# Patient Record
Sex: MALE | Race: WHITE | Employment: FULL TIME | URBAN - METROPOLITAN AREA
[De-identification: names, ages, dates, MRNs, and addresses within clinical notes are randomized per-mention and may not be internally consistent; named-entity substitution may affect disease eponyms.]

---

## 2019-09-04 ENCOUNTER — HOSPITAL ENCOUNTER (OUTPATIENT)
Dept: GENERAL RADIOLOGY | Age: 54
Discharge: HOME | End: 2019-09-06
Payer: COMMERCIAL

## 2019-09-04 ENCOUNTER — HOSPITAL ENCOUNTER (OUTPATIENT)
Age: 54
Discharge: HOME | End: 2019-09-06
Payer: COMMERCIAL

## 2019-09-04 DIAGNOSIS — M54.2 CERVICALGIA: ICD-10-CM

## 2019-09-04 DIAGNOSIS — M65.812 OTHER SYNOVITIS AND TENOSYNOVITIS, LEFT SHOULDER: ICD-10-CM

## 2019-09-04 DIAGNOSIS — M25.512 LEFT SHOULDER PAIN, UNSPECIFIED CHRONICITY: ICD-10-CM

## 2019-09-04 PROCEDURE — 72050 X-RAY EXAM NECK SPINE 4/5VWS: CPT

## 2019-09-04 PROCEDURE — 73030 X-RAY EXAM OF SHOULDER: CPT

## 2019-09-10 ENCOUNTER — HOSPITAL ENCOUNTER (OUTPATIENT)
Dept: MRI IMAGING | Age: 54
Discharge: HOME | End: 2019-09-12
Payer: COMMERCIAL

## 2019-09-10 DIAGNOSIS — M65.812 OTHER SYNOVITIS AND TENOSYNOVITIS, LEFT SHOULDER: ICD-10-CM

## 2019-09-10 DIAGNOSIS — M75.42 IMPINGEMENT SYNDROME OF LEFT SHOULDER: ICD-10-CM

## 2019-09-10 DIAGNOSIS — M25.512 LEFT SHOULDER PAIN, UNSPECIFIED CHRONICITY: ICD-10-CM

## 2019-09-10 PROCEDURE — 73221 MRI JOINT UPR EXTREM W/O DYE: CPT

## 2021-03-31 ENCOUNTER — HOSPITAL ENCOUNTER (OUTPATIENT)
Age: 56
Discharge: HOME | End: 2021-04-02
Payer: COMMERCIAL

## 2021-03-31 ENCOUNTER — HOSPITAL ENCOUNTER (OUTPATIENT)
Dept: GENERAL RADIOLOGY | Age: 56
Discharge: HOME | End: 2021-04-02
Payer: COMMERCIAL

## 2021-03-31 DIAGNOSIS — M79.674 PAIN IN RIGHT TOE(S): ICD-10-CM

## 2021-03-31 DIAGNOSIS — M25.474 EFFUSION, RIGHT FOOT: ICD-10-CM

## 2021-03-31 DIAGNOSIS — M25.571 PAIN IN RIGHT ANKLE AND JOINTS OF RIGHT FOOT: ICD-10-CM

## 2021-03-31 PROCEDURE — 73630 X-RAY EXAM OF FOOT: CPT

## 2021-06-01 ENCOUNTER — HOSPITAL ENCOUNTER (OUTPATIENT)
Dept: GENERAL RADIOLOGY | Age: 56
Discharge: HOME | End: 2021-06-03
Payer: COMMERCIAL

## 2021-06-01 ENCOUNTER — HOSPITAL ENCOUNTER (OUTPATIENT)
Age: 56
Discharge: HOME | End: 2021-06-03
Payer: COMMERCIAL

## 2021-06-01 DIAGNOSIS — M99.02 SEGMENTAL AND SOMATIC DYSFUNCTION OF THORACIC REGION: ICD-10-CM

## 2021-06-01 DIAGNOSIS — M99.01 SEGMENTAL AND SOMATIC DYSFUNCTION OF CERVICAL REGION: ICD-10-CM

## 2021-06-01 DIAGNOSIS — S13.4XXA SPRAIN OF LIGAMENTS OF CERVICAL SPINE, INITIAL ENCOUNTER: ICD-10-CM

## 2021-06-01 DIAGNOSIS — S23.3XXA SPRAIN OF LIGAMENTS OF THORACIC SPINE, INITIAL ENCOUNTER: ICD-10-CM

## 2021-06-01 PROCEDURE — 72050 X-RAY EXAM NECK SPINE 4/5VWS: CPT

## 2021-06-01 PROCEDURE — 72072 X-RAY EXAM THORAC SPINE 3VWS: CPT

## 2022-12-17 ENCOUNTER — APPOINTMENT (OUTPATIENT)
Dept: GENERAL RADIOLOGY | Age: 57
End: 2022-12-17
Payer: COMMERCIAL

## 2022-12-17 ENCOUNTER — HOSPITAL ENCOUNTER (EMERGENCY)
Age: 57
Discharge: ANOTHER HOSPITAL | End: 2022-12-17
Attending: EMERGENCY MEDICINE
Payer: COMMERCIAL

## 2022-12-17 ENCOUNTER — HOSPITAL ENCOUNTER (INPATIENT)
Age: 57
LOS: 1 days | Discharge: HOME | DRG: 247 | End: 2022-12-19
Attending: INTERNAL MEDICINE | Admitting: INTERNAL MEDICINE
Payer: COMMERCIAL

## 2022-12-17 VITALS
HEIGHT: 70 IN | TEMPERATURE: 98.4 F | DIASTOLIC BLOOD PRESSURE: 96 MMHG | SYSTOLIC BLOOD PRESSURE: 170 MMHG | HEART RATE: 74 BPM | BODY MASS INDEX: 28.49 KG/M2 | WEIGHT: 199 LBS | RESPIRATION RATE: 20 BRPM | OXYGEN SATURATION: 98 %

## 2022-12-17 DIAGNOSIS — I21.3 ST ELEVATION MYOCARDIAL INFARCTION (STEMI), UNSPECIFIED ARTERY (HCC): ICD-10-CM

## 2022-12-17 DIAGNOSIS — N18.31 STAGE 3A CHRONIC KIDNEY DISEASE (HCC): ICD-10-CM

## 2022-12-17 DIAGNOSIS — I25.5 ISCHEMIC CARDIOMYOPATHY: ICD-10-CM

## 2022-12-17 DIAGNOSIS — R77.8 ELEVATED TROPONIN: Primary | ICD-10-CM

## 2022-12-17 DIAGNOSIS — I21.21 ST ELEVATION MYOCARDIAL INFARCTION INVOLVING LEFT CIRCUMFLEX CORONARY ARTERY (HCC): Primary | ICD-10-CM

## 2022-12-17 LAB
ALBUMIN SERPL-MCNC: 4.1 G/DL (ref 3.5–5.2)
ALP BLD-CCNC: 75 U/L (ref 40–129)
ALT SERPL-CCNC: 24 U/L (ref 0–40)
ANION GAP SERPL CALCULATED.3IONS-SCNC: 12 MMOL/L (ref 7–16)
APTT: 26.2 SEC (ref 24.5–35.1)
AST SERPL-CCNC: 25 U/L (ref 0–39)
BILIRUB SERPL-MCNC: 0.2 MG/DL (ref 0–1.2)
BUN BLDV-MCNC: 27 MG/DL (ref 6–20)
CALCIUM SERPL-MCNC: 9.5 MG/DL (ref 8.6–10.2)
CHLORIDE BLD-SCNC: 100 MMOL/L (ref 98–107)
CO2: 25 MMOL/L (ref 22–29)
CREAT SERPL-MCNC: 2 MG/DL (ref 0.7–1.2)
D DIMER: 683 NG/ML DDU
GFR SERPL CREATININE-BSD FRML MDRD: 38 ML/MIN/1.73
GLUCOSE BLD-MCNC: 205 MG/DL (ref 74–99)
HCT VFR BLD CALC: 44.9 % (ref 37–54)
HEMOGLOBIN: 15.8 G/DL (ref 12.5–16.5)
INR BLD: 0.9
MAGNESIUM: 2.3 MG/DL (ref 1.6–2.6)
MCH RBC QN AUTO: 30 PG (ref 26–35)
MCHC RBC AUTO-ENTMCNC: 35.2 % (ref 32–34.5)
MCV RBC AUTO: 85.2 FL (ref 80–99.9)
PDW BLD-RTO: 12.6 FL (ref 11.5–15)
PLATELET # BLD: 188 E9/L (ref 130–450)
PMV BLD AUTO: 9 FL (ref 7–12)
POC ACT LR: 203 SECONDS
POC ACT LR: 238 SECONDS
POC ACT LR: 289 SECONDS
POTASSIUM SERPL-SCNC: 4.2 MMOL/L (ref 3.5–5)
PRO-BNP: 290 PG/ML (ref 0–125)
PROTHROMBIN TIME: 10.1 SEC (ref 9.3–12.4)
RBC # BLD: 5.27 E12/L (ref 3.8–5.8)
SODIUM BLD-SCNC: 137 MMOL/L (ref 132–146)
TOTAL PROTEIN: 7.1 G/DL (ref 6.4–8.3)
TROPONIN, HIGH SENSITIVITY: 130 NG/L (ref 0–11)
TROPONIN, HIGH SENSITIVITY: 160 NG/L (ref 0–11)
WBC # BLD: 10.9 E9/L (ref 4.5–11.5)

## 2022-12-17 PROCEDURE — 2580000003 HC RX 258: Performed by: INTERNAL MEDICINE

## 2022-12-17 PROCEDURE — 2709999900 HC NON-CHARGEABLE SUPPLY

## 2022-12-17 PROCEDURE — C1874 STENT, COATED/COV W/DEL SYS: HCPCS

## 2022-12-17 PROCEDURE — 71046 X-RAY EXAM CHEST 2 VIEWS: CPT

## 2022-12-17 PROCEDURE — 83735 ASSAY OF MAGNESIUM: CPT

## 2022-12-17 PROCEDURE — 85027 COMPLETE CBC AUTOMATED: CPT

## 2022-12-17 PROCEDURE — 84484 ASSAY OF TROPONIN QUANT: CPT

## 2022-12-17 PROCEDURE — 92941 PRQ TRLML REVSC TOT OCCL AMI: CPT

## 2022-12-17 PROCEDURE — 4A023N7 MEASUREMENT OF CARDIAC SAMPLING AND PRESSURE, LEFT HEART, PERCUTANEOUS APPROACH: ICD-10-PCS | Performed by: INTERNAL MEDICINE

## 2022-12-17 PROCEDURE — 93005 ELECTROCARDIOGRAM TRACING: CPT

## 2022-12-17 PROCEDURE — 93458 L HRT ARTERY/VENTRICLE ANGIO: CPT

## 2022-12-17 PROCEDURE — 96376 TX/PRO/DX INJ SAME DRUG ADON: CPT

## 2022-12-17 PROCEDURE — G0378 HOSPITAL OBSERVATION PER HR: HCPCS

## 2022-12-17 PROCEDURE — G0379 DIRECT REFER HOSPITAL OBSERV: HCPCS

## 2022-12-17 PROCEDURE — 99291 CRITICAL CARE FIRST HOUR: CPT | Performed by: INTERNAL MEDICINE

## 2022-12-17 PROCEDURE — 85347 COAGULATION TIME ACTIVATED: CPT

## 2022-12-17 PROCEDURE — 2500000003 HC RX 250 WO HCPCS

## 2022-12-17 PROCEDURE — C1725 CATH, TRANSLUMIN NON-LASER: HCPCS

## 2022-12-17 PROCEDURE — 6370000000 HC RX 637 (ALT 250 FOR IP)

## 2022-12-17 PROCEDURE — 92928 PRQ TCAT PLMT NTRAC ST 1 LES: CPT | Performed by: INTERNAL MEDICINE

## 2022-12-17 PROCEDURE — 6360000002 HC RX W HCPCS

## 2022-12-17 PROCEDURE — 85730 THROMBOPLASTIN TIME PARTIAL: CPT

## 2022-12-17 PROCEDURE — B211YZZ FLUOROSCOPY OF MULTIPLE CORONARY ARTERIES USING OTHER CONTRAST: ICD-10-PCS | Performed by: INTERNAL MEDICINE

## 2022-12-17 PROCEDURE — 93458 L HRT ARTERY/VENTRICLE ANGIO: CPT | Performed by: INTERNAL MEDICINE

## 2022-12-17 PROCEDURE — C1894 INTRO/SHEATH, NON-LASER: HCPCS

## 2022-12-17 PROCEDURE — 83880 ASSAY OF NATRIURETIC PEPTIDE: CPT

## 2022-12-17 PROCEDURE — 2580000003 HC RX 258

## 2022-12-17 PROCEDURE — 93005 ELECTROCARDIOGRAM TRACING: CPT | Performed by: PHYSICIAN ASSISTANT

## 2022-12-17 PROCEDURE — 85610 PROTHROMBIN TIME: CPT

## 2022-12-17 PROCEDURE — 6370000000 HC RX 637 (ALT 250 FOR IP): Performed by: INTERNAL MEDICINE

## 2022-12-17 PROCEDURE — C1769 GUIDE WIRE: HCPCS

## 2022-12-17 PROCEDURE — C1887 CATHETER, GUIDING: HCPCS

## 2022-12-17 PROCEDURE — 99285 EMERGENCY DEPT VISIT HI MDM: CPT

## 2022-12-17 PROCEDURE — 85378 FIBRIN DEGRADE SEMIQUANT: CPT

## 2022-12-17 PROCEDURE — 027034Z DILATION OF CORONARY ARTERY, ONE ARTERY WITH DRUG-ELUTING INTRALUMINAL DEVICE, PERCUTANEOUS APPROACH: ICD-10-PCS | Performed by: INTERNAL MEDICINE

## 2022-12-17 PROCEDURE — 96365 THER/PROPH/DIAG IV INF INIT: CPT

## 2022-12-17 PROCEDURE — 80053 COMPREHEN METABOLIC PANEL: CPT

## 2022-12-17 RX ORDER — ACETAMINOPHEN 325 MG/1
650 TABLET ORAL EVERY 4 HOURS PRN
Status: DISCONTINUED | OUTPATIENT
Start: 2022-12-17 | End: 2022-12-19

## 2022-12-17 RX ORDER — 0.9 % SODIUM CHLORIDE 0.9 %
500 INTRAVENOUS SOLUTION INTRAVENOUS ONCE
Status: COMPLETED | OUTPATIENT
Start: 2022-12-17 | End: 2022-12-17

## 2022-12-17 RX ORDER — HEPARIN SODIUM 10000 [USP'U]/100ML
5-30 INJECTION, SOLUTION INTRAVENOUS CONTINUOUS
Status: DISCONTINUED | OUTPATIENT
Start: 2022-12-17 | End: 2022-12-17 | Stop reason: HOSPADM

## 2022-12-17 RX ORDER — LOSARTAN POTASSIUM 25 MG/1
25 TABLET ORAL DAILY
Status: DISCONTINUED | OUTPATIENT
Start: 2022-12-18 | End: 2022-12-18

## 2022-12-17 RX ORDER — ROSUVASTATIN CALCIUM 20 MG/1
40 TABLET, COATED ORAL NIGHTLY
Status: DISCONTINUED | OUTPATIENT
Start: 2022-12-17 | End: 2022-12-19 | Stop reason: HOSPADM

## 2022-12-17 RX ORDER — OXYCODONE HYDROCHLORIDE AND ACETAMINOPHEN 5; 325 MG/1; MG/1
1 TABLET ORAL EVERY 4 HOURS PRN
Status: DISCONTINUED | OUTPATIENT
Start: 2022-12-17 | End: 2022-12-19 | Stop reason: HOSPADM

## 2022-12-17 RX ORDER — HEPARIN SODIUM 1000 [USP'U]/ML
4000 INJECTION, SOLUTION INTRAVENOUS; SUBCUTANEOUS PRN
Status: DISCONTINUED | OUTPATIENT
Start: 2022-12-17 | End: 2022-12-17 | Stop reason: HOSPADM

## 2022-12-17 RX ORDER — HEPARIN SODIUM 1000 [USP'U]/ML
2000 INJECTION, SOLUTION INTRAVENOUS; SUBCUTANEOUS PRN
Status: DISCONTINUED | OUTPATIENT
Start: 2022-12-17 | End: 2022-12-17 | Stop reason: HOSPADM

## 2022-12-17 RX ORDER — HEPARIN SODIUM 1000 [USP'U]/ML
4000 INJECTION, SOLUTION INTRAVENOUS; SUBCUTANEOUS ONCE
Status: COMPLETED | OUTPATIENT
Start: 2022-12-17 | End: 2022-12-17

## 2022-12-17 RX ORDER — ASPIRIN 81 MG/1
81 TABLET ORAL DAILY
Status: DISCONTINUED | OUTPATIENT
Start: 2022-12-18 | End: 2022-12-19 | Stop reason: HOSPADM

## 2022-12-17 RX ORDER — CARVEDILOL 3.12 MG/1
3.12 TABLET ORAL 2 TIMES DAILY WITH MEALS
Status: DISCONTINUED | OUTPATIENT
Start: 2022-12-18 | End: 2022-12-18

## 2022-12-17 RX ORDER — LISINOPRIL 2.5 MG/1
2.5 TABLET ORAL DAILY
Status: ON HOLD | COMMUNITY
End: 2022-12-19 | Stop reason: HOSPADM

## 2022-12-17 RX ORDER — SODIUM CHLORIDE 9 MG/ML
INJECTION, SOLUTION INTRAVENOUS CONTINUOUS
Status: ACTIVE | OUTPATIENT
Start: 2022-12-17 | End: 2022-12-18

## 2022-12-17 RX ADMIN — HEPARIN SODIUM 4000 UNITS: 1000 INJECTION INTRAVENOUS; SUBCUTANEOUS at 19:57

## 2022-12-17 RX ADMIN — SODIUM CHLORIDE 500 ML: 9 INJECTION, SOLUTION INTRAVENOUS at 19:39

## 2022-12-17 RX ADMIN — ROSUVASTATIN 40 MG: 20 TABLET, FILM COATED ORAL at 23:36

## 2022-12-17 RX ADMIN — HEPARIN SODIUM 11.07 UNITS/KG/HR: 10000 INJECTION, SOLUTION INTRAVENOUS at 20:03

## 2022-12-17 RX ADMIN — ASPIRIN 325 MG: 325 TABLET, DELAYED RELEASE ORAL at 19:44

## 2022-12-17 RX ADMIN — SODIUM CHLORIDE: 9 INJECTION, SOLUTION INTRAVENOUS at 23:35

## 2022-12-17 ASSESSMENT — PAIN DESCRIPTION - FREQUENCY: FREQUENCY: CONTINUOUS

## 2022-12-17 ASSESSMENT — PAIN - FUNCTIONAL ASSESSMENT
PAIN_FUNCTIONAL_ASSESSMENT: 0-10

## 2022-12-17 ASSESSMENT — ENCOUNTER SYMPTOMS
COLOR CHANGE: 0
SHORTNESS OF BREATH: 1
CHOKING: 0
COUGH: 0
ABDOMINAL PAIN: 0
VOMITING: 0
DIARRHEA: 0
CONSTIPATION: 0
SORE THROAT: 0
NAUSEA: 0
BLOOD IN STOOL: 0

## 2022-12-17 ASSESSMENT — PAIN SCALES - GENERAL
PAINLEVEL_OUTOF10: 5
PAINLEVEL_OUTOF10: 5
PAINLEVEL_OUTOF10: 0
PAINLEVEL_OUTOF10: 5
PAINLEVEL_OUTOF10: 5
PAINLEVEL_OUTOF10: 4

## 2022-12-17 ASSESSMENT — PAIN DESCRIPTION - ORIENTATION
ORIENTATION: MID
ORIENTATION: MID

## 2022-12-17 ASSESSMENT — LIFESTYLE VARIABLES
HOW MANY STANDARD DRINKS CONTAINING ALCOHOL DO YOU HAVE ON A TYPICAL DAY: 1 OR 2
HOW OFTEN DO YOU HAVE A DRINK CONTAINING ALCOHOL: 2-4 TIMES A MONTH

## 2022-12-17 ASSESSMENT — PAIN DESCRIPTION - PAIN TYPE: TYPE: ACUTE PAIN

## 2022-12-17 ASSESSMENT — PAIN DESCRIPTION - LOCATION
LOCATION: CHEST

## 2022-12-17 ASSESSMENT — PAIN DESCRIPTION - DESCRIPTORS
DESCRIPTORS: DISCOMFORT
DESCRIPTORS: ACHING

## 2022-12-17 ASSESSMENT — PAIN DESCRIPTION - ONSET: ONSET: ON-GOING

## 2022-12-17 NOTE — ED PROVIDER NOTES
[address]      Pt Name: Maci Sheikh  MRN: [id number]  [place] 1965  Date of evaluation: 12/17/2022      CHIEF COMPLAINT       Chief Complaint   Patient presents with    Shortness of Breath     SOB for three days, epigastric pain started around 1400, denies other symptoms        HPI  Maci Sheikh is a 62 y.o. male  with PMHx of CKD, HTN  presents with SOB, CP. CP started today at around 12:30pm, substernal, constant, dull. States symptoms started 3 days ago, worse with exertion. Describes symptoms moderate in severity with no alleviating or exacerbating factors. Denies any fever, chills, n/v, headache, dizziness, vision changes, neck tenderness or stiffness, weakness,  palpitations, leg swelling/tenderness, cough, abd pain, dysuria, hematuria, diarrhea, constipation. Except as noted above the remainder of the review of systems was reviewed and negative. Review of Systems   Constitutional:  Negative for appetite change, chills, fatigue and fever. HENT:  Negative for congestion and sore throat. Eyes:  Negative for visual disturbance. Respiratory:  Positive for shortness of breath. Negative for cough and choking. Cardiovascular:  Positive for chest pain. Negative for palpitations and leg swelling. Gastrointestinal:  Negative for abdominal pain, blood in stool, constipation, diarrhea, nausea and vomiting. Endocrine: Negative for polyphagia. Genitourinary:  Negative for decreased urine volume, difficulty urinating, flank pain and hematuria. Musculoskeletal:  Negative for arthralgias, gait problem, joint swelling and myalgias. Skin:  Negative for color change, pallor, rash and wound. Neurological:  Negative for dizziness, tremors, seizures, syncope, weakness, light-headedness, numbness and headaches. Hematological:  Negative for adenopathy. Does not bruise/bleed easily. Psychiatric/Behavioral:  Negative for confusion and hallucinations. All other systems reviewed and are negative. Physical Exam  Vitals reviewed. Constitutional:       General: He is not in acute distress. Appearance: Normal appearance. He is well-developed and normal weight. He is not ill-appearing, toxic-appearing or diaphoretic. HENT:      Head: Normocephalic and atraumatic. Right Ear: External ear normal.      Left Ear: External ear normal.      Nose: Nose normal. No congestion or rhinorrhea. Mouth/Throat:      Mouth: Mucous membranes are moist.      Pharynx: Oropharynx is clear. No oropharyngeal exudate or posterior oropharyngeal erythema. Eyes:      Extraocular Movements: Extraocular movements intact. Conjunctiva/sclera: Conjunctivae normal.      Pupils: Pupils are equal, round, and reactive to light. Cardiovascular:      Rate and Rhythm: Normal rate and regular rhythm. Pulses: Normal pulses. Pulmonary:      Effort: Pulmonary effort is normal. No respiratory distress. Breath sounds: Normal breath sounds. No wheezing or rhonchi. Chest:      Chest wall: Tenderness present. Abdominal:      General: Abdomen is flat. Bowel sounds are normal. There is no distension. Palpations: Abdomen is soft. Tenderness: There is no abdominal tenderness. There is no right CVA tenderness, left CVA tenderness or guarding. Hernia: No hernia is present. Musculoskeletal:      Cervical back: Normal range of motion. Right lower leg: No edema. Left lower leg: No edema. Skin:     General: Skin is warm and dry. Capillary Refill: Capillary refill takes less than 2 seconds. Neurological:      General: No focal deficit present. Mental Status: He is alert and oriented to person, place, and time. Mental status is at baseline. Psychiatric:         Mood and Affect: Mood normal.         Behavior: Behavior normal.         Thought Content:  Thought content normal.         Judgment: Judgment normal.        Procedures     MDM         62 y.o. male  with PMHx of CKD, HTN  presents with SOB, CP. While in the ED patient was hemodynamically stable, afebrile, nontoxic-appearing, in no respiratory distress. Labs remarkable for elevated troponin, BNP, dimer, elevated Cr at around baseline (last cr 1.9). EKG normal sinus with ST elevations on inferolateral leads, evolving EKG in the ED, with ST depression on septal leads. CXR negative for any acute pathologies. Bedside ultrasound negative for pericardial effusion or collapse of Right ventricle. Patient received ASA and heparin bolus and drip with significant symptomatic relief. Spoke to Dr. Jin Potter who will take patient to cath lab, although this more be pericarditis in nature. Patient transferred to cath lab, he is in agreement with plan of transfer. ED Course as of 12/17/22 2111   Sat Dec 17, 2022   1800 EKG: This EKG is signed by emergency department physician. Rate: 68  Qtc:412  Rhythm: Sinus  Interpretation: Sinus, ST elevations inferolateral leads  Comparison: no previous EKG       [TC]   1937 EKG: This EKG is signed by emergency department physician. Rate: 71  Qtc:415ms  Rhythm: Sinus  Interpretation: Sinus with ST elevations iferolateral leads, and ST depressions septal leads  Comparison: changes compared to previous EKG      [TC]      ED Course User Emilio Braxton MD       --------------------------------------------- PAST HISTORY ---------------------------------------------  Past Medical History:  has a past medical history of Chronic kidney disease. Past Surgical History:  has no past surgical history on file. Social History:  reports that he has never smoked. He has never used smokeless tobacco. He reports that he does not use drugs. Family History: family history is not on file. The patients home medications have been reviewed.     Allergies: Patient has no known allergies.     -------------------------------------------------- RESULTS -------------------------------------------------    Lab  Results for orders placed or performed during the hospital encounter of 12/17/22   CBC   Result Value Ref Range    WBC 10.9 4.5 - 11.5 E9/L    RBC 5.27 3.80 - 5.80 E12/L    Hemoglobin 15.8 12.5 - 16.5 g/dL    Hematocrit 44.9 37.0 - 54.0 %    MCV 85.2 80.0 - 99.9 fL    MCH 30.0 26.0 - 35.0 pg    MCHC 35.2 (H) 32.0 - 34.5 %    RDW 12.6 11.5 - 15.0 fL    Platelets 040 119 - 666 E9/L    MPV 9.0 7.0 - 12.0 fL   Comprehensive Metabolic Panel   Result Value Ref Range    Sodium 137 132 - 146 mmol/L    Potassium 4.2 3.5 - 5.0 mmol/L    Chloride 100 98 - 107 mmol/L    CO2 25 22 - 29 mmol/L    Anion Gap 12 7 - 16 mmol/L    Glucose 205 (H) 74 - 99 mg/dL    BUN 27 (H) 6 - 20 mg/dL    Creatinine 2.0 (H) 0.7 - 1.2 mg/dL    Est, Glom Filt Rate 38 >=60 mL/min/1.73    Calcium 9.5 8.6 - 10.2 mg/dL    Total Protein 7.1 6.4 - 8.3 g/dL    Albumin 4.1 3.5 - 5.2 g/dL    Total Bilirubin 0.2 0.0 - 1.2 mg/dL    Alkaline Phosphatase 75 40 - 129 U/L    ALT 24 0 - 40 U/L    AST 25 0 - 39 U/L   Troponin   Result Value Ref Range    Troponin, High Sensitivity 130 (H) 0 - 11 ng/L   Troponin   Result Value Ref Range    Troponin, High Sensitivity 160 (H) 0 - 11 ng/L   Magnesium   Result Value Ref Range    Magnesium 2.3 1.6 - 2.6 mg/dL   D-Dimer, Quantitative   Result Value Ref Range    D-Dimer, Quant 683 ng/mL DDU   Protime-INR   Result Value Ref Range    Protime 10.1 9.3 - 12.4 sec    INR 0.9    APTT   Result Value Ref Range    aPTT 26.2 24.5 - 35.1 sec   Brain Natriuretic Peptide   Result Value Ref Range    Pro- (H) 0 - 125 pg/mL   EKG 12 Lead   Result Value Ref Range    Ventricular Rate 71 BPM    Atrial Rate 71 BPM    P-R Interval 160 ms    QRS Duration 94 ms    Q-T Interval 382 ms    QTc Calculation (Bazett) 415 ms    P Axis 51 degrees    R Axis 0 degrees    T Axis 39 degrees   EKG 12 Lead   Result Value Ref Range    Ventricular Rate 68 BPM    Atrial Rate 68 BPM    P-R Interval 164 ms    QRS Duration 106 ms    Q-T Interval 388 ms    QTc Calculation (Bazett) 412 ms    P Axis 53 degrees    R Axis 13 degrees    T Axis 48 degrees       Radiology  XR CHEST (2 VW)   Final Result   No acute process. ------------------------- NURSING NOTES AND VITALS REVIEWED ---------------------------  Date / Time Roomed:  12/17/2022  5:14 PM  ED Bed Assignment:  09/09    The nursing notes within the ED encounter and vital signs as below have been reviewed. Patient Vitals for the past 24 hrs:   BP Temp Temp src Pulse Resp SpO2 Height Weight   12/17/22 2027 (!) 170/96 98.4 °F (36.9 °C) Oral 74 20 98 % -- --   12/17/22 1941 (!) 165/112 -- -- 78 23 98 % -- --   12/17/22 1848 (!) 153/107 -- -- 76 20 98 % -- --   12/17/22 1747 (!) 161/106 -- -- 73 20 100 % -- --   12/17/22 1708 (!) 167/104 97.3 °F (36.3 °C) Oral 73 16 100 % 5' 10\" (1.778 m) 199 lb (90.3 kg)   12/17/22 1656 -- -- -- 88 20 98 % -- --       Oxygen Saturation Interpretation: Normal      ------------------------------------------ PROGRESS NOTES ------------------------------------------  Re-evaluation(s):  Time: 06:30. Patients symptoms show no change  Repeat physical examination is not changed    Time: 0700. Patients symptoms show no change  Repeat physical examination is not changed    I have spoken with the patient and discussed todays results, in addition to providing specific details for the plan of care and counseling regarding the diagnosis and prognosis. Their questions are answered at this time and they are agreeable with the plan. I have discussed the risks and benefits of transfer and they wish to proceed with the transfer. --------------------------------- ADDITIONAL PROVIDER NOTES ---------------------------------  Consultations:  Spoke with Dr. Ronald King (Cardiology). Discussed case. They will take patient to cath lab.   Reason for transfer: STEMI.    This patient's ED course included: a personal history and physicial examination, re-evaluation prior to disposition, multiple bedside re-evaluations, IV medications, cardiac monitoring, continuous pulse oximetry, and complex medical decision making and emergency management    This patient has remained hemodynamically stable during their ED course. Please note that the withdrawal or failure to initiate urgent interventions for this patient would likely result in a life threatening deterioration or permanent disability. Clinical Impression  1. Elevated troponin    2. ST elevation myocardial infarction (STEMI), unspecified artery ([facility] [address]          Disposition  Patient's disposition: Transfer to Cath lab. Transferred by: Ambulance. Patient's condition is stable.        Nohemy Gamez MD  Resident  12/17/22 9500

## 2022-12-18 LAB
ABO/RH: NORMAL
ANION GAP SERPL CALCULATED.3IONS-SCNC: 14 MMOL/L (ref 7–16)
ANTIBODY SCREEN: NORMAL
BASOPHILS ABSOLUTE: 0.02 E9/L (ref 0–0.2)
BASOPHILS RELATIVE PERCENT: 0.2 % (ref 0–2)
BUN BLDV-MCNC: 23 MG/DL (ref 6–20)
CALCIUM SERPL-MCNC: 9.2 MG/DL (ref 8.6–10.2)
CHLORIDE BLD-SCNC: 106 MMOL/L (ref 98–107)
CHOLESTEROL, FASTING: 202 MG/DL (ref 0–199)
CO2: 19 MMOL/L (ref 22–29)
CREAT SERPL-MCNC: 1.8 MG/DL (ref 0.7–1.2)
EKG ATRIAL RATE: 68 BPM
EKG ATRIAL RATE: 71 BPM
EKG P AXIS: 51 DEGREES
EKG P AXIS: 53 DEGREES
EKG P-R INTERVAL: 160 MS
EKG P-R INTERVAL: 164 MS
EKG Q-T INTERVAL: 382 MS
EKG Q-T INTERVAL: 388 MS
EKG QRS DURATION: 106 MS
EKG QRS DURATION: 94 MS
EKG QTC CALCULATION (BAZETT): 412 MS
EKG QTC CALCULATION (BAZETT): 415 MS
EKG R AXIS: 0 DEGREES
EKG R AXIS: 13 DEGREES
EKG T AXIS: 39 DEGREES
EKG T AXIS: 48 DEGREES
EKG VENTRICULAR RATE: 68 BPM
EKG VENTRICULAR RATE: 71 BPM
EOSINOPHILS ABSOLUTE: 0.02 E9/L (ref 0.05–0.5)
EOSINOPHILS RELATIVE PERCENT: 0.2 % (ref 0–6)
GFR SERPL CREATININE-BSD FRML MDRD: 43 ML/MIN/1.73
GLUCOSE BLD-MCNC: 113 MG/DL (ref 74–99)
HBA1C MFR BLD: 5.2 % (ref 4–5.6)
HCT VFR BLD CALC: 41.5 % (ref 37–54)
HDLC SERPL-MCNC: 44 MG/DL
HEMOGLOBIN: 15.2 G/DL (ref 12.5–16.5)
IMMATURE GRANULOCYTES #: 0.03 E9/L
IMMATURE GRANULOCYTES %: 0.3 % (ref 0–5)
LDL CHOLESTEROL CALCULATED: 85 MG/DL (ref 0–99)
LYMPHOCYTES ABSOLUTE: 1.53 E9/L (ref 1.5–4)
LYMPHOCYTES RELATIVE PERCENT: 13 % (ref 20–42)
MCH RBC QN AUTO: 29.7 PG (ref 26–35)
MCHC RBC AUTO-ENTMCNC: 36.6 % (ref 32–34.5)
MCV RBC AUTO: 81.1 FL (ref 80–99.9)
MONOCYTES ABSOLUTE: 1.11 E9/L (ref 0.1–0.95)
MONOCYTES RELATIVE PERCENT: 9.5 % (ref 2–12)
NEUTROPHILS ABSOLUTE: 9.02 E9/L (ref 1.8–7.3)
NEUTROPHILS RELATIVE PERCENT: 76.8 % (ref 43–80)
PDW BLD-RTO: 12.7 FL (ref 11.5–15)
PLATELET # BLD: 172 E9/L (ref 130–450)
PMV BLD AUTO: 9.2 FL (ref 7–12)
POTASSIUM SERPL-SCNC: 3.9 MMOL/L (ref 3.5–5)
RBC # BLD: 5.12 E12/L (ref 3.8–5.8)
SODIUM BLD-SCNC: 139 MMOL/L (ref 132–146)
TRIGLYCERIDE, FASTING: 365 MG/DL (ref 0–149)
VLDLC SERPL CALC-MCNC: 73 MG/DL
WBC # BLD: 11.7 E9/L (ref 4.5–11.5)

## 2022-12-18 PROCEDURE — 86901 BLOOD TYPING SEROLOGIC RH(D): CPT

## 2022-12-18 PROCEDURE — G0378 HOSPITAL OBSERVATION PER HR: HCPCS

## 2022-12-18 PROCEDURE — 80061 LIPID PANEL: CPT

## 2022-12-18 PROCEDURE — 93010 ELECTROCARDIOGRAM REPORT: CPT | Performed by: INTERNAL MEDICINE

## 2022-12-18 PROCEDURE — 93306 TTE W/DOPPLER COMPLETE: CPT

## 2022-12-18 PROCEDURE — 80048 BASIC METABOLIC PNL TOTAL CA: CPT

## 2022-12-18 PROCEDURE — 83036 HEMOGLOBIN GLYCOSYLATED A1C: CPT

## 2022-12-18 PROCEDURE — 93005 ELECTROCARDIOGRAM TRACING: CPT | Performed by: INTERNAL MEDICINE

## 2022-12-18 PROCEDURE — 99214 OFFICE O/P EST MOD 30 MIN: CPT | Performed by: INTERNAL MEDICINE

## 2022-12-18 PROCEDURE — 86850 RBC ANTIBODY SCREEN: CPT

## 2022-12-18 PROCEDURE — 93005 ELECTROCARDIOGRAM TRACING: CPT | Performed by: PHYSICIAN ASSISTANT

## 2022-12-18 PROCEDURE — 85025 COMPLETE CBC W/AUTO DIFF WBC: CPT

## 2022-12-18 PROCEDURE — 6370000000 HC RX 637 (ALT 250 FOR IP): Performed by: INTERNAL MEDICINE

## 2022-12-18 PROCEDURE — 36415 COLL VENOUS BLD VENIPUNCTURE: CPT

## 2022-12-18 PROCEDURE — 86900 BLOOD TYPING SEROLOGIC ABO: CPT

## 2022-12-18 RX ORDER — CARVEDILOL 6.25 MG/1
6.25 TABLET ORAL 2 TIMES DAILY WITH MEALS
Status: DISCONTINUED | OUTPATIENT
Start: 2022-12-18 | End: 2022-12-19 | Stop reason: HOSPADM

## 2022-12-18 RX ORDER — LOSARTAN POTASSIUM 50 MG/1
100 TABLET ORAL DAILY
Status: DISCONTINUED | OUTPATIENT
Start: 2022-12-18 | End: 2022-12-19 | Stop reason: HOSPADM

## 2022-12-18 RX ORDER — LOSARTAN POTASSIUM 50 MG/1
50 TABLET ORAL DAILY
Status: DISCONTINUED | OUTPATIENT
Start: 2022-12-18 | End: 2022-12-18

## 2022-12-18 RX ADMIN — CARVEDILOL 6.25 MG: 6.25 TABLET, FILM COATED ORAL at 18:02

## 2022-12-18 RX ADMIN — ASPIRIN 81 MG: 81 TABLET, COATED ORAL at 10:10

## 2022-12-18 RX ADMIN — CARVEDILOL 6.25 MG: 6.25 TABLET, FILM COATED ORAL at 10:13

## 2022-12-18 RX ADMIN — TICAGRELOR 90 MG: 90 TABLET ORAL at 10:10

## 2022-12-18 RX ADMIN — ROSUVASTATIN 40 MG: 20 TABLET, FILM COATED ORAL at 20:39

## 2022-12-18 RX ADMIN — TICAGRELOR 90 MG: 90 TABLET ORAL at 20:39

## 2022-12-18 RX ADMIN — LOSARTAN POTASSIUM 100 MG: 50 TABLET, FILM COATED ORAL at 10:10

## 2022-12-18 ASSESSMENT — PAIN SCALES - GENERAL: PAINLEVEL_OUTOF10: 0

## 2022-12-18 NOTE — CONSULTS
Cardiology critical care consult note    Reason for consultation: We are asked to see Mr. Luís Painting in consultation by the emergency room physician regarding an inferior STEMI. History of chief complaint: This is a 59-year-old gentleman with a past medical history of chronic renal insufficiency and possible hypertension. He has been having shortness of breath and dyspnea on exertion for the past 3 days. Today he developed midsternal chest discomfort at approximately noon or 1230. This persisted and therefore he presented to the [facility] emergency room. His initial ECG was felt not to meet criteria for STEMI. However, his chest discomfort persisted in the emergency room. Therefore repeat ECG was performed and they felt that the ST segments had worsened to meet STEMI criteria. At that point I was called as a STEMI doctor on-call. I reviewed the ECGs and felt that this was a STEMI or possible pericarditis. Therefore I asked him to perform a stat bedside ultrasound of the heart to make sure there was no pericardial effusion before giving heparin. There was no pericardial effusion. Therefore I felt that this was a STEMI and recommended aspirin, heparin, and stat transfer to the cardiac Cath Lab. Upon arrival in the Cath Lab he still continuing to have chest discomfort. Allergies: No known allergies. Medications: Lisinopril  Social history: He denies smoking. Further is unobtainable. Surgical history: Unobtainable  Family history: Unobtainable  Medical history as above. Labs:  Sodium 137, potassium 4.2, creatinine 2.0  Magnesium 2.3  Troponin 160  Glucose 205  WBCs 10.9, hemoglobin 15.8, platelets 542    ECG: Sinus rhythm, 71 bpm.  ST elevations in 2, 3, aVF, and V5 and V6. Physical exam:  General: He is alert and orient x3, communicates well, in mild to moderate distress.   Vitals: Blood pressure is 126/81 and pulse is 65  Neck: Supple, full range of motion, no JVD or bruits. Heart: Regular rate and rhythm. Normal S1 and S2. No murmurs. Lungs: Clear to auscultation bilaterally. Extremities: Full range of motion x4. Good distal pulses. Eyes: Extraocular muscles intact. Normal lids and conjunctiva. Skin: Warm, dry, intact. Neuro: Full range of motion x4. No tremors. Assessment:  Acute inferior STEMI  Chronic renal insufficiency  Hypertension  Elevated glucose. Recommendations:  Aspirin  Heparin  Beta-blocker depending on vitals  ACE inhibitor or angiotensin receptor blocker depending on LV function. Statin  Stat cardiac catheterization possible PCI. 48 minutes critical care.

## 2022-12-18 NOTE — ED NOTES
Attempted to call report but their is now answer at the receiving phone number.      Nori Saleh RN  12/17/22 2051

## 2022-12-18 NOTE — PROGRESS NOTES
PROGRESS NOTE     CARDIOLOGY    Chief complaint: Seen today for follow up, management & recommendations for STEMI, CAD. He denies chest pain or shortness of breath today. He was reclining in bed. He was comfortable and in no distress. Wt Readings from Last 3 Encounters:   12/17/22 210 lb 4 oz (95.4 kg)   12/17/22 199 lb (90.3 kg)     Temp Readings from Last 3 Encounters:   12/18/22 98.3 °F (36.8 °C) (Temporal)   12/17/22 98.4 °F (36.9 °C) (Oral)     BP Readings from Last 3 Encounters:   12/18/22 (!) 135/94   12/17/22 (!) 170/96     Pulse Readings from Last 3 Encounters:   12/18/22 83   12/17/22 74         Intake/Output Summary (Last 24 hours) at 12/18/2022 1131  Last data filed at 12/18/2022 0750  Gross per 24 hour   Intake 502.71 ml   Output 1800 ml   Net -1297.29 ml       Recent Labs     12/17/22  1746 12/18/22  0607   WBC 10.9 11.7*   HGB 15.8 15.2   HCT 44.9 41.5   MCV 85.2 81.1    172     Recent Labs     12/17/22 1746 12/18/22  0607    139   K 4.2 3.9    106   CO2 25 19*   BUN 27* 23*   CREATININE 2.0* 1.8*   MG 2.3  --      Recent Labs     12/17/22 1746   PROTIME 10.1   INR 0.9     No results for input(s): CKTOTAL, CKMB, CKMBINDEX, TROPONINI in the last 72 hours. No results for input(s): BNP in the last 72 hours. Recent Labs     12/18/22  0607   HDL 44     Recent Labs     12/17/22 1746 12/17/22  1922   TROPHS 130* 160*         carvedilol (COREG) tablet 6.25 mg, BID WC  losartan (COZAAR) tablet 100 mg, Daily  acetaminophen (TYLENOL) tablet 650 mg, Q4H PRN  aspirin EC tablet 81 mg, Daily  ticagrelor (BRILINTA) tablet 90 mg, BID  rosuvastatin (CRESTOR) tablet 40 mg, Nightly  oxyCODONE-acetaminophen (PERCOCET) 5-325 MG per tablet 1 tablet, Q4H PRN  perflutren lipid microspheres (DEFINITY) injection 1.5 mL, ONCE PRN        Review of systems:     Heart: as above   Lungs: as above   Eyes: denies changes in vision or discharge. Ears: denies changes in hearing or pain.    Nose: denies epistaxis or masses   Throat: denies sore throat or trouble swallowing. Neuro: denies numbness, tingling, tremors. Skin: denies rashes or itching. : denies hematuria, dysuria   GI: denies vomiting, diarrhea   Psych: denies mood changed, anxiety, depression. Physical exam:    Constitutional: A&O x3, communicates well, no acute distress. Eyes: extraocular muscles intact, PERRL. Normal lids & conjunctiva. No icterus. ENT: clear, no bleeding. No external masses. Lips normal formation. Neck: supple, full ROM, no JVD, no bruits, no lymphadenopathy. No masses. trachea midline. Heart: regular rate & rhythm, normal S1 & S2, no abnormal murmurs. No heave. Lungs: CTA. No accessory muscles. Abd: soft, non-tender. Normal bowel sounds. Neuro: Full ROM X 4, EOMI, no tremors. EXT: No bilateral lower extremity edema  Skin: warm, dry, intact. Good turgor. Psych: A&O x 3, normal behavior, not anxious. Radial access site: No complications. Assessment/Recommendations  STEMI. No symptoms today. Coronary artery disease. Successful 3.5 x 26 mm drug-eluting stent to the mid circumflex last night. Great results. Chronic renal insufficiency. Stable after cardiac catheterization. Hypertension. I will increase the Coreg and losartan today. We will need to follow the lites and creatinine. Mildly elevated glucose. Will defer to others. Echocardiogram.  Possible LifeVest and possible change to [facility] based on echo results. Note: This report was completed using computerized voice recognition software. Every effort has been made to ensure accuracy, however; and invert and computerized transcription errors may be present.

## 2022-12-18 NOTE — PROCEDURES
Procedure:    1. Left heart cath  2. Percutaneous coronary artery intervention of the mid circumflex with a 3.5 x 26 mm drug-eluting stent. Complications: None    Physician: Chapito Parisi DO. Assistant: none    Indication: STEMI  AUC: 9  AUC indication: 2    PCI AUC: 9  PCI AUC incication: 1    Anesthesia: 2% Xylocaine, intravenous fentanyl     Sedation: Intravenous Versed    Sedation time: I was present for sedation administration at 2110. I ended sedation at 2153 for a total face-to-face sedation time of 43 minutes. Estimated blood loss: Minimal    Specimens: none    Contrast used: 100 cc    Hemodynamics:  Opening Aortic pressure: 251/48  LV systolic pressure: 896   LVEDP: 21  No significant gradient across the aortic valve. Angiographic Results/findings:  Left Main: No angiographically significant stenosis. LAD: Mid diffuse 40 to 50% stenosis. D1: Small vessel. No angiographically significant stenosis. Hester Potters Cx: Mid 100% occlusion. Ramus: No angiographically significant stenosis. .  RCA: Mid diffuse 40 to 50% stenosis. .  PDA: No angiographically significant stenosis. Hester Potters PLB: No angiographically significant stenosis. .    Interventional results:  Mid circumflex lesion  PrePCI VERO 0 flow. Successful predilatation of the mid circumflex lesion with a 2.5 mm balloon. This lesion was then crossed and stented with a 3.5 x 26 mm drug-eluting stent to 14 atmospheres of pressure. This resulted in 0 percent residual stenosis with VERO-3 flow. Summary of the procedure:  After obtaining informed consent they were taken to the cardiac Cath Lab where the area over the right radial artery was prepped and draped in a sterile fashion. Using ultrasound guidance and a micropuncture technique a 6 [place] slender rain sheath was placed in the right radial artery. This was aspirated & flushed several times throughout the procedure. This was medicated with verapamil and nitroglycerin.   A 6 f R4 guiding catheter was advanced over wire to the aortic root. This was aspirated and flushed with saline pressures were obtained. This was then filled with contrast and manipulated in the right coronary artery. 2 orthogonal views were obtained. Catheter was removed over wire and a 6 [place]la JCL 3 5 guiding catheter was advanced and was too short to reach the left main. Therefore an EBU 3.5 guiding catheter was used. 3 orthogonal views of the left coronary system were obtained. They were then anticoagulated with heparin to maintain an ACT greater than 250. A 6 f EBU 3 5 guiding catheter was used. They were already on ASA & they were loaded with Brilinta. A luge wire was advanced across the circumflex lesion and placed in the distal vessel. The lesion was then crossed and predilated with a 2.5 mm balloon. The lesion was then crossed and stented with a 3.5 x 26 mm drug-eluting stent inflated to 14 atmospheres of pressure. This resulted in 0 percent residual stenosis VERO 3 flow. The balloon and wire were removed. A follow-up angiogram was performed. This demonstrated 0% residual stenosis and excellent VERO-3 flow. A 5 [place] angled pigtail was advanced and manipulated into the left ventricle. The catheter was aspirated flushed with saline pressures were obtained. No LV gram was performed due to his renal insufficiency. Pulmonary pressures across aortic valve were obtained. The radial artery sheath was removed and vas band placed with good patent hemostasis. They tolerated the procedure well with no complications. Note: This report was completed using computerized voice recognition software. Every effort has been made to ensure accuracy, however; and invert and computerized transcription errors may be present.

## 2022-12-18 NOTE — PROGRESS NOTES
Called back to room by patient who after pulling self to sitting position using had rt hand site was bleeding replaced air till stopped cleaned up patient re instructed on importance of keeping wrist immobile not using it.

## 2022-12-18 NOTE — CONSULTS
Met with patient and discussed that their physician has ordered a referral to our outpatient Phase II Cardiac Rehabilitation program. Reviewed the benefits of cardiac rehabilitation based on their diagnosis and personal risk factors. Patient demonstrates moderate interest in Cardiac Rehabilitation at this time. Cardiac Rehabilitation brochure provided to patient/family. The Cardiac Rehabilitation Program has been provided the patient's referral information and pertinent patient details and history. The patient may call [facility] [employer] at [phone] for additional information or questions. Contact information for [facility] [employer] and other choices close to the patient's residence have been provided in the discharge instructions so that the patient may call and schedule an appointment when cleared by their physician.  Thank you for the referral.

## 2022-12-18 NOTE — PROGRESS NOTES
1957 called [facility]. ETA for pick-up approx, 60 minutes. 2000 called [employer] regarding possibility of flight. No answer.

## 2022-12-18 NOTE — H&P
Hospital Medicine History & Physical      PCP: Jaswant Vivas MD    Date of Admission: 12/17/2022    Date of Service: . DEC 18, 2022    Chief Complaint:   CHEST PAIN      History Of Present Illness:     62 y.o. male presented with  CHEST PAIN, ONSET 1 DAY WITH SOB X 3 DAYS. LOCATED ACW NON RADIATING, CONTINUOUS  NO NV, NO DIAPHORESIS, NO PALPITATION    Past Medical History:          Diagnosis Date    Chronic kidney disease     Hypertension     MI (myocardial infarction) ([address] 12/17/2022    Renal insufficiency        Past Surgical History:          Procedure Laterality Date    CORONARY ANGIOPLASTY WITH STENT PLACEMENT  12/17/2022    3.5 x 26mm Hung Blue [facility] to [facility]. Dr. Alysia Alanis       Medications Prior to Admission:      Prior to Admission medications    Medication Sig Start Date End Date Taking? Authorizing Provider   lisinopril (PRINIVIL;ZESTRIL) 2.5 MG tablet Take 2.5 mg by mouth daily   Yes Historical Provider, MD       Allergies:  Patient has no known allergies. Social History:      The patient currently lives WIFE    TOBACCO:   reports that he has never smoked. He has never used smokeless tobacco.  ETOH:   reports current alcohol use of about 4.0 standard drinks per week. Family History:       Reviewed in detail and negative for DM, CAD, Cancer, CVA. Positive as follows:        Problem Relation Age of Onset    Cancer Mother     High Blood Pressure Father     Kidney Disease Father     Cancer Father        REVIEW OF SYSTEMS:   Pertinent positives as noted in the HPI. All other systems reviewed and negative. PHYSICAL EXAM:    BP (!) 135/94   Pulse 83   Temp 98.3 °F (36.8 °C) (Temporal)   Resp 18   Ht 5' 10\" (1.778 m)   Wt 210 lb 4 oz (95.4 kg)   SpO2 97%   BMI 30.17 kg/m²     General appearance:  No apparent distress, appears stated age.   HEENT:  Normal cephalic, atraumatic without obvious deformity. Pupils equal, round, and reactive to light. Extra ocular muscles intact. Conjunctivae/corneas clear. Neck: Supple, with full range of motion. No jugular venous distention. Trachea midline. Respiratory:  Normal respiratory effort. Clear to auscultation,   Cardiovascular:  RRR  Abdomen: Soft, non-tender, non-distended with normal bowel sounds. Musculoskeletal:  No clubbing, cyanosis or edema bilaterally. Skin: smooth and dry   Neurologic:   Cranial nerves: II-XII intact,   Psychiatric:  Alert and oriented x 3        Labs:     Recent Labs     12/17/22  1746 12/18/22  0607   WBC 10.9 11.7*   HGB 15.8 15.2   HCT 44.9 41.5    172     Recent Labs     12/17/22  1746 12/18/22  0607    139   K 4.2 3.9    106   CO2 25 19*   BUN 27* 23*   CREATININE 2.0* 1.8*   CALCIUM 9.5 9.2     Recent Labs     12/17/22  1746   AST 25   ALT 24   BILITOT 0.2   ALKPHOS 75     Recent Labs     12/17/22  1746   INR 0.9     No results for input(s): Carlos Beets in the last 72 hours. Urinalysis:    No results found for: Chick Estrella, BACTERIA, RBCUA, BLOODU, [address], [address]    Radiology:           No orders to display       ASSESSMENT:    Active Hospital Problems    Diagnosis Date Noted    STEMI (ST elevation myocardial infarction) ([facility] [I21.3] 12/17/2022     Priority: Medium   PCKD   HTN  S/P CARDIAC CATH  S/P STENT PLACEMENT      PLAN:  ? LIFE VEST  CRESTOR   BRILINTA   COZAAR   COREG   ASPRIN      DVT Prophylaxis: BRILINTA  Diet: ADULT DIET; Regular; Low Fat/Low Chol/High Fiber/CHANDNI  Code Status: Full Code    PT/OT Eval Status:  NA    Dispo -  HOME    Electronically signed by Wes Spence DO on 12/18/2022 at 3:21 PM [facility]       Thank you Darling Summers MD for the opportunity to be involved in this patient's care.  If you have any questions or concerns please feel free to contact me at [phone]

## 2022-12-18 NOTE — DISCHARGE INSTRUCTIONS
Heart-Healthy Diet: Care Instructions  Your Care Instructions     A heart-healthy diet has lots of vegetables, fruits, nuts, beans, and whole grains, and is low in salt. It limits foods that are high in saturated fat, such as meats, cheeses, and fried foods. It may be hard to change your diet, but even small changes can lower your risk of heart attack and heart disease. Follow-up care is a key part of your treatment and safety. Be sure to make and go to all appointments, and call your doctor if you are having problems. It's also a good idea to know your test results and keep a list of the medicines you take. How can you care for yourself at home? Watch your portions  Use food labels to learn what the recommended servings are for the foods you eat. Eat only the number of calories you need to stay at a healthy weight. If you need to lose weight, eat fewer calories than your body burns (through exercise and other physical activity). Eat more fruits and vegetables  Eat a variety of fruit and vegetables every day. Dark green, deep orange, red, or yellow fruits and vegetables are especially good for you. Examples include spinach, carrots, peaches, and berries. Keep carrots, celery, and other veggies handy for snacks. Buy fruit that is in season and store it where you can see it so that you will be tempted to eat it. Cook dishes that have a lot of veggies in them, such as stir-fries and soups. Limit saturated fat  Read food labels, and try to avoid saturated fats. They increase your risk of heart disease. Use olive or canola oil when you cook. Bake, broil, grill, or steam foods instead of frying them. Choose lean meats instead of high-fat meats such as hot dogs and sausages. Cut off all visible fat when you prepare meat. Eat fish, skinless poultry, and meat alternatives such as soy products instead of high-fat meats. Soy products, such as tofu, may be especially good for your heart.   Choose low-fat or fat-free milk and dairy products. Eat foods high in fiber  Eat a variety of grain products every day. Include whole-grain foods that have lots of fiber and nutrients. Examples of whole-grain foods include oats, whole wheat bread, and brown rice. Buy whole-grain breads and cereals, instead of white bread or pastries. Limit salt and sodium  Limit how much salt and sodium you eat to help lower your blood pressure. Taste food before you salt it. Add only a little salt when you think you need it. With time, your taste buds will adjust to less salt. Eat fewer snack items, fast foods, and other high-salt, processed foods. Check food labels for the amount of sodium in packaged foods. Choose low-sodium versions of canned goods (such as soups, vegetables, and beans). Limit sugar  Limit drinks and foods with added sugar. These include candy, desserts, and soda pop. Limit alcohol  Limit alcohol to no more than 2 drinks a day for men and 1 drink a day for women. Too much alcohol can cause health problems. When should you call for help? Watch closely for changes in your health, and be sure to contact your doctor if:    You would like help planning heart-healthy meals. Where can you learn more? Go to http://www.woods.com/ and enter V137 to learn more about \"Heart-Healthy Diet: Care Instructions. \"  Current as of: October 6, 2021               Content Version: 13.5  © 2006-2022 [employer]. Care instructions adapted under license by [facility] ([facility]). If you have questions about a medical condition or this instruction, always ask your healthcare professional. [place] [address] any warranty or liability for your use of this information. Coronary Angioplasty: What to Expect at [facility]     Coronary angioplasty is a procedure that is used to open a narrowed or blocked coronary artery. It may also be called a percutaneous coronary intervention (PCI).  The doctor opened your narrowed or blocked artery by putting a thin tube, called a catheter, into your heart through a blood vessel. The catheter was inserted into the blood vessel in your groin or wrist. The doctor may have placed a small tube, called a stent, in the artery. Your groin or wrist may have a bruise and feel sore for a few days after the procedure. You can do light activities around the house. But don't do anything strenuous until your doctor says it is okay. This may be for several days. This care sheet gives you a general idea about how long it will take for you to recover. But each person recovers at a different pace. Follow the steps below to get better as quickly as possible. How can you care for yourself at home? Activity    If the doctor gave you a sedative: For 24 hours, don't do anything that requires attention to detail, such as going to work, making important decisions, or signing any legal documents. It takes time for the medicine's effects to completely wear off. For your safety, do not drive or operate any machinery that could be dangerous. Wait until the medicine wears off and you can think clearly and react easily. Do not do strenuous exercise and do not lift, pull, or push anything heavy until your doctor says it is okay. This may be for several days. You can walk around the house and do light activity, such as cooking. If the catheter was placed in your groin, try not to walk up stairs for the first couple of days. If the catheter was placed in your arm near your wrist, do not bend your wrist deeply for the first couple of days. Be careful using your hand to get into and out of a chair or bed. Carry your stent identification card with you at all times. If your doctor recommends it, get more exercise. Walking is a good choice. Bit by bit, increase the amount you walk every day. Try for at least 30 minutes on most days of the week.      If you haven't been set up with a cardiac rehab program, talk to your doctor about whether rehab is right for you. Cardiac rehab includes supervised exercise. It also includes help with diet and lifestyle changes and emotional support. Diet    Drink plenty of fluids to help your body flush out the dye. If you have kidney, heart, or liver disease and have to limit fluids, talk with your doctor before you increase the amount of fluids you drink. Keep eating a heart-healthy diet that has lots of fruits, vegetables, and whole grains. If you have not been eating this way, talk to your doctor. You also may want to talk to a dietitian. This expert can help you to learn about healthy foods and plan meals. Medicines    Your doctor will tell you if and when you can restart your medicines. Your doctor will also give you instructions about taking any new medicines. If you stopped taking aspirin or some other blood thinner, your doctor will tell you when to start taking it again. You will take medicine that prevents blood clots. You may take aspirin plus another antiplatelet. It is very important that you take these medicines exactly as directed. These medicines help keep the coronary artery open and reduce your risk of a heart attack. Call your doctor if you think you are having a problem with your medicine. Care of the catheter site    For 1 or 2 days, keep a bandage over the spot where the catheter was inserted. The bandage probably will fall off in this time. Put ice or a cold pack on the area for 10 to 20 minutes at a time to help with soreness or swelling. Put a thin cloth between the ice and your skin. You may shower 24 to 48 hours after the procedure, if your doctor okays it. Pat the incision dry. Do not soak the catheter site until it is healed. Don't take a bath for 1 week, or until your doctor tells you it is okay. Watch for bleeding from the site.  A small amount of blood (up to the size of a quarter) on the bandage can be normal.     If you are bleeding, lie down and press on the area for 15 minutes to try to make it stop. If the bleeding does not stop, call your doctor or seek immediate medical care. Follow-up care is a key part of your treatment and safety. Be sure to make and go to all appointments, and call your doctor if you are having problems. It's also a good idea to know your test results and keep a list of the medicines you take. When should you call for help? Call 911 anytime you think you may need emergency care. For example, call if:    You passed out (lost consciousness). You have severe trouble breathing. You have sudden chest pain and shortness of breath, or you cough up blood. You have symptoms of a heart attack, such as:  Chest pain or pressure. Sweating. Shortness of breath. Nausea or vomiting. Pain that spreads from the chest to the neck, jaw, or one or both shoulders or arms. Dizziness or lightheadedness. A fast or uneven pulse. After calling 911, chew 1 adult-strength aspirin. Wait for an ambulance. Do not try to drive yourself. You have been diagnosed with angina, and you have angina symptoms that do not go away with rest or are not getting better within 5 minutes after you take one dose of nitroglycerin. Call your doctor now or seek immediate medical care if:    You are bleeding from the area where the catheter was put in your artery. You have a fast-growing, painful lump at the catheter site. You have signs of infection, such as: Increased pain, swelling, warmth, or redness. Red streaks leading from the catheter site. Pus draining from the catheter site. A fever. Your leg or hand is painful, looks blue, or feels cold, numb, or tingly. Watch closely for changes in your health, and be sure to contact your doctor if you have any problems. Where can you learn more?   Go to http://www.woods.com/ and enter M291 to learn more about \"Coronary Angioplasty: What to Expect at Home. \"  Current as of: September 7, 2022               Content Version: 13.5  © 2006-2022 [employer]. Care instructions adapted under license by [facility] ([facility]). If you have questions about a medical condition or this instruction, always ask your healthcare professional. [address] any warranty or liability for your use of this information. Cardiac Rehabilitation: Discharge instructions        Cardiac rehabilitation is a program for people who have a heart problem, such as a heart attack, coronary stent placed, heart failure, or a heart valve disease. The program includes exercise, lifestyle changes, education, and emotional support. Cardiac rehab can help you improve the quality of your life through better overall health. It can help you lose weight and feel better about yourself. On your cardiac rehab team, you may have your doctor, a nurse specialist, an exercise physiologist, and a dietitian. They will design your cardiac rehab program specifically for you. You will learn how to reduce your risk for heart problems, how to manage stress, and how to eat a heart-healthy diet. By the end of the program, you will be ready to maintain a healthier lifestyle on your own. Follow-up care is a key part of your treatment and safety. Be sure to make and go to all appointments, and call your doctor if you are having problems. It's also a good idea to know your test results and keep a list of the medicines you take. Please call to schedule your first appointment once you have been cleared by your cardiologist to attend Phase II Outpatient Cardiac Rehabilitation. Cardiac Rehabilitation Options:  [address] [place] [address].                                                                                           Cardiology [address] [facility] [address]                                                                              [address] [phone]                                                                                         [facility], [facility]  Hours: M/W/F 7am-7pm & T/Th 7am-12pm                                                  [phone]                                                                                                                          [phone] Professor [address]  Cardiac Rehab  [facility]. [address] [place]  P- [phone]                                                                                         Cardiac Rehabilitation  Hours: M/W/F 7am-6pm                                                                                [place], [address]  [facility]                                                          [phone]  Cardiac Rehabilitation                                                                                   [phone]  [address] [place] [address] Dr, [address]                                                                                        [facility]  [phone]                                                                                          Cardiac Rehabilitation  [phone]                                                                                          [address] [place] [address].  [address] Radha Molina                                                                                                                        [phone]                                                                                                                        [phone]

## 2022-12-19 ENCOUNTER — TELEPHONE (OUTPATIENT)
Dept: CARDIOLOGY CLINIC | Age: 57
End: 2022-12-19

## 2022-12-19 VITALS
HEIGHT: 70 IN | TEMPERATURE: 98.4 F | RESPIRATION RATE: 18 BRPM | BODY MASS INDEX: 30.1 KG/M2 | OXYGEN SATURATION: 96 % | SYSTOLIC BLOOD PRESSURE: 108 MMHG | DIASTOLIC BLOOD PRESSURE: 67 MMHG | HEART RATE: 85 BPM | WEIGHT: 210.25 LBS

## 2022-12-19 PROBLEM — I25.5 ISCHEMIC CARDIOMYOPATHY: Status: ACTIVE | Noted: 2022-12-19

## 2022-12-19 PROBLEM — I10 PRIMARY HYPERTENSION: Status: ACTIVE | Noted: 2022-12-19

## 2022-12-19 PROBLEM — N18.31 STAGE 3A CHRONIC KIDNEY DISEASE (HCC): Status: ACTIVE | Noted: 2022-12-19

## 2022-12-19 LAB
ANION GAP SERPL CALCULATED.3IONS-SCNC: 13 MMOL/L (ref 7–16)
BUN BLDV-MCNC: 23 MG/DL (ref 6–20)
CALCIUM SERPL-MCNC: 9.4 MG/DL (ref 8.6–10.2)
CHLORIDE BLD-SCNC: 105 MMOL/L (ref 98–107)
CO2: 21 MMOL/L (ref 22–29)
CREAT SERPL-MCNC: 2 MG/DL (ref 0.7–1.2)
EKG ATRIAL RATE: 76 BPM
EKG ATRIAL RATE: 81 BPM
EKG ATRIAL RATE: 92 BPM
EKG P AXIS: 49 DEGREES
EKG P AXIS: 50 DEGREES
EKG P-R INTERVAL: 158 MS
EKG P-R INTERVAL: 166 MS
EKG Q-T INTERVAL: 412 MS
EKG Q-T INTERVAL: 420 MS
EKG Q-T INTERVAL: 436 MS
EKG QRS DURATION: 140 MS
EKG QRS DURATION: 84 MS
EKG QRS DURATION: 90 MS
EKG QTC CALCULATION (BAZETT): 487 MS
EKG QTC CALCULATION (BAZETT): 490 MS
EKG QTC CALCULATION (BAZETT): 509 MS
EKG R AXIS: -122 DEGREES
EKG R AXIS: 1 DEGREES
EKG R AXIS: 10 DEGREES
EKG T AXIS: -126 DEGREES
EKG T AXIS: 22 DEGREES
EKG T AXIS: 46 DEGREES
EKG VENTRICULAR RATE: 76 BPM
EKG VENTRICULAR RATE: 81 BPM
EKG VENTRICULAR RATE: 92 BPM
GFR SERPL CREATININE-BSD FRML MDRD: 38 ML/MIN/1.73
GLUCOSE BLD-MCNC: 109 MG/DL (ref 74–99)
POTASSIUM SERPL-SCNC: 4 MMOL/L (ref 3.5–5)
SODIUM BLD-SCNC: 139 MMOL/L (ref 132–146)

## 2022-12-19 PROCEDURE — 2140000000 HC CCU INTERMEDIATE R&B

## 2022-12-19 PROCEDURE — 93010 ELECTROCARDIOGRAM REPORT: CPT | Performed by: INTERNAL MEDICINE

## 2022-12-19 PROCEDURE — 93005 ELECTROCARDIOGRAM TRACING: CPT | Performed by: INTERNAL MEDICINE

## 2022-12-19 PROCEDURE — 36415 COLL VENOUS BLD VENIPUNCTURE: CPT

## 2022-12-19 PROCEDURE — 80048 BASIC METABOLIC PNL TOTAL CA: CPT

## 2022-12-19 PROCEDURE — 99232 SBSQ HOSP IP/OBS MODERATE 35: CPT | Performed by: INTERNAL MEDICINE

## 2022-12-19 PROCEDURE — 6370000000 HC RX 637 (ALT 250 FOR IP): Performed by: INTERNAL MEDICINE

## 2022-12-19 RX ORDER — LISINOPRIL 10 MG/1
10 TABLET ORAL DAILY
Qty: 90 TABLET | Refills: 3 | Status: SHIPPED | OUTPATIENT
Start: 2022-12-19

## 2022-12-19 RX ORDER — LOSARTAN POTASSIUM 100 MG/1
100 TABLET ORAL DAILY
Qty: 30 TABLET | Refills: 3 | Status: CANCELLED | OUTPATIENT
Start: 2022-12-20

## 2022-12-19 RX ORDER — ASPIRIN 81 MG/1
81 TABLET ORAL DAILY
Qty: 180 TABLET | Refills: 3 | Status: SHIPPED | OUTPATIENT
Start: 2022-12-20

## 2022-12-19 RX ORDER — CARVEDILOL 6.25 MG/1
6.25 TABLET ORAL 2 TIMES DAILY WITH MEALS
Qty: 180 TABLET | Refills: 3 | Status: SHIPPED | OUTPATIENT
Start: 2022-12-19

## 2022-12-19 RX ORDER — ACETAMINOPHEN 325 MG/1
650 TABLET ORAL EVERY 4 HOURS PRN
Status: DISCONTINUED | OUTPATIENT
Start: 2022-12-19 | End: 2022-12-19 | Stop reason: HOSPADM

## 2022-12-19 RX ORDER — ROSUVASTATIN CALCIUM 40 MG/1
40 TABLET, COATED ORAL NIGHTLY
Qty: 90 TABLET | Refills: 3 | Status: SHIPPED | OUTPATIENT
Start: 2022-12-19

## 2022-12-19 RX ADMIN — LOSARTAN POTASSIUM 100 MG: 50 TABLET, FILM COATED ORAL at 09:25

## 2022-12-19 RX ADMIN — ASPIRIN 81 MG: 81 TABLET, COATED ORAL at 09:25

## 2022-12-19 RX ADMIN — CARVEDILOL 6.25 MG: 6.25 TABLET, FILM COATED ORAL at 17:16

## 2022-12-19 RX ADMIN — CARVEDILOL 6.25 MG: 6.25 TABLET, FILM COATED ORAL at 09:25

## 2022-12-19 RX ADMIN — ROSUVASTATIN 40 MG: 20 TABLET, FILM COATED ORAL at 17:20

## 2022-12-19 RX ADMIN — TICAGRELOR 90 MG: 90 TABLET ORAL at 09:25

## 2022-12-19 NOTE — TELEPHONE ENCOUNTER
DO Deena Kulkarni  Should follow-up in 1 to 2 weeks. May need to use midlevel and then follow-up with me 1 to 2 months after that. He is okay to resume normal activities 1 week after discharge.

## 2022-12-19 NOTE — PROGRESS NOTES
Hospitalist Progress Note      PCP: Pramod Baker MD    Date of Admission: 12/17/2022        Hospital Course:  62 y.o. male presented with  CHEST PAIN, ONSET 1 DAY WITH SOB X 3 DAYS. LOCATED ACW NON RADIATING, CONTINUOUS  NO NV, NO DIAPHORESIS, NO PALPITATION** AIC 5.2   EF 45%        Subjective: NO CHEST PAIN          Medications:  Reviewed    Infusion Medications   Scheduled Medications    carvedilol  6.25 mg Oral BID WC    losartan  100 mg Oral Daily    aspirin  81 mg Oral Daily    ticagrelor  90 mg Oral BID    rosuvastatin  40 mg Oral Nightly     PRN Meds: acetaminophen, oxyCODONE-acetaminophen, perflutren lipid microspheres      Intake/Output Summary (Last 24 hours) at 12/19/2022 1522  Last data filed at 12/19/2022 1308  Gross per 24 hour   Intake 600 ml   Output 1850 ml   Net -1250 ml       Exam:    /67   Pulse 85   Temp 98.4 °F (36.9 °C) (Temporal)   Resp 18   Ht 5' 10\" (1.778 m)   Wt 210 lb 4 oz (95.4 kg)   SpO2 96%   BMI 30.17 kg/m²       General appearance:  No apparent distress, appears stated age. HEENT:  Normal cephalic,   Neck: Supple, with full range of motion. . Trachea midline. Respiratory:  Normal respiratory effort. Clear to auscultation,   Cardiovascular:  RRR  Abdomen: Soft, non-tender, non-distended with normal bowel sounds. Musculoskeletal:  No clubbing, cyanosis or edema bilaterally.     Skin: smooth and dry   Neurologic:   Cranial nerves: II-XII intact,   Psychiatric:  Alert and oriented x 3           Labs:   Recent Labs     12/17/22  1746 12/18/22  0607   WBC 10.9 11.7*   HGB 15.8 15.2   HCT 44.9 41.5    172     Recent Labs     12/17/22  1746 12/18/22  0607 12/19/22  0605    139 139   K 4.2 3.9 4.0    106 105   CO2 25 19* 21*   BUN 27* 23* 23*   CREATININE 2.0* 1.8* 2.0*   CALCIUM 9.5 9.2 9.4     Recent Labs     12/17/22  1746   AST 25   ALT 24   BILITOT 0.2   ALKPHOS 75     Recent Labs     12/17/22  1746   INR 0.9     No results for input(s): Mary Ann Sotelo in the last 72 hours. Recent Labs     12/17/22  1746   AST 25   ALT 24   BILITOT 0.2   ALKPHOS 75     No results for input(s): LACTA in the last 72 hours. No results found for: Julfrancia Palmas  No results found for: AMMONIA    Assessment:    Active Hospital Problems    Diagnosis Date Noted    STEMI (ST elevation myocardial infarction) ([facility] [address] [I21.3] 12/17/2022     Priority: Medium   PCKD   HTN  S/P CARDIAC CATH  S/P STENT PLACEMENT        PLAN:  ? LIFE VEST  CRESTOR   BRILINTA   COZAAR   COREG   ASPRIN        DVT Prophylaxis: BRILINTA  Diet: ADULT DIET;  Regular; Low Fat/Low Chol/High Fiber/CHANDNI  Code Status: Full Code     PT/OT Eval Status:  NA     Dispo -  HOME    Electronically signed by Lissa Lowry DO on 12/19/2022 at 3:22 PM [facility]

## 2022-12-19 NOTE — PROGRESS NOTES
Patient is seen in follow-up for elevation MI    Subjective:     Mr. Riky Archer feels okay, denies any chest pain or shortness of breath  Sitting up in chair no apparent distress    ROS:  CONSTITUTIONAL:  negative for  fevers, chills  HEENT:  negative for earaches, nasal congestion and epistaxis  RESPIRATORY:  negative for  dry cough, cough with sputum,wheezing and hemoptysis  GASTROINTESTINAL:  negative for nausea, vomiting  MUSCULOSKELETAL:  negative for  myalgias, arthralgias  NEUROLOGICAL:  negative for visual disturbance, dysphagia    Medication side effects: none    Scheduled Meds:   carvedilol  6.25 mg Oral BID WC    losartan  100 mg Oral Daily    aspirin  81 mg Oral Daily    ticagrelor  90 mg Oral BID    rosuvastatin  40 mg Oral Nightly     Continuous Infusions:  PRN Meds:acetaminophen, oxyCODONE-acetaminophen, perflutren lipid microspheres    I/O last 3 completed shifts: In: 742.7 [P.O.:240; I.V.:502.7]  Out: 3000 [Urine:3000]  I/O this shift:  In: 600 [P.O.:600]  Out: 650 [Urine:650]      Objective:      Physical Exam:   /67   Pulse 85   Temp 98.4 °F (36.9 °C) (Temporal)   Resp 18   Ht 5' 10\" (1.778 m)   Wt 210 lb 4 oz (95.4 kg)   SpO2 96%   BMI 30.17 kg/m²   CONSTITUTIONAL:  awake, alert, cooperative, no apparent distress, and appears stated age  HEAD:  normocepalic, without obvious abnormality, atraumatic  NECK:  Supple, symmetrical, trachea midline, no adenopathy, thyroid symmetric, not enlarged and no tenderness, skin normal  LUNGS:  No increased work of breathing, No accessory muscle use or intercostal retractions, good air exchange, clear to auscultation bilaterally, no crackles or wheezing  CARDIOVASCULAR:  Normal apical impulse, regular rate and rhythm, normal S1 and S2, no S3 or S4, and no murmur noted, no edema, no JVD, no carotid bruit. ABDOMEN:  Soft, nontender, no masses, no hepatomegaly, no splenomegaly, BS+  MUSCULOSKELETAL:  No clubbing no cyanosis. there is no redness, warmth, or swelling of the joints  full range of motion noted  NEUROLOGIC:  Alert, awake,oriented x3  SKIN:  no bruising or bleeding, normal skin color, texture, turgor and no redness, warmth, or swelling      Cardiographics  I personally reviewed the telemetry monitor strips with the following interpretation: Sinus rhythm    Echocardiogram: 12/18/2022,   posterior hypokinesis. Mildly reduced left ventricular systolic function. Physiologic mitral regurgitation. Imaging  No orders to display       Lab Review   Lab Results   Component Value Date/Time     12/19/2022 06:05 AM    K 4.0 12/19/2022 06:05 AM     12/19/2022 06:05 AM    CO2 21 12/19/2022 06:05 AM    BUN 23 12/19/2022 06:05 AM    CREATININE 2.0 12/19/2022 06:05 AM    GLUCOSE 109 12/19/2022 06:05 AM    CALCIUM 9.4 12/19/2022 06:05 AM     Lab Results   Component Value Date/Time    WBC 11.7 12/18/2022 06:07 AM    HGB 15.2 12/18/2022 06:07 AM    HCT 41.5 12/18/2022 06:07 AM    MCV 81.1 12/18/2022 06:07 AM     12/18/2022 06:07 AM     I have personally reviewed the laboratory, cardiac diagnostic and radiographic testing as outlined above:    Assessment:   1. NSTEMI patient s/p PCI to left circumflex artery, doing fine, will continue current treatment  2. Ischemic cardiomyopathy: EF 45%, continue beta-blocker and ACE inhibitor  3. Hypertension: Controlled  4. Stage III chronic kidney disease     Recommendations:     1. Continue current treatment  2. Risk of instent thrombosis due to premature discontinuation of either ASA or Brilinta discussed with patient at length  3. Basic metabolic panel as an outpatient to follow-up on renal function  4.  will keep a food for 3 weeks   5. Increase ambulation as tolerated    Can be discharged from cardiac standpoint    Electronically signed by Dilan Blackman MD on 12/19/2022 at 5:21 PM  NOTE: This report was transcribed using voice recognition software.  Every effort was made to ensure accuracy; however, inadvertent computerized transcription errors may be present

## 2022-12-19 NOTE — PLAN OF CARE
EKG performed by CSU staff with no epic order on 12/18/2022. Notified IBIS Pablo to place order in care path. Spoke to Zahra that no repeat needed with this order at this time.

## 2022-12-19 NOTE — CARE COORDINATION
Patient presented to Ed with c/o SOB x 3 days. Cardiology consulted and patient had l heart cath with Percutaneous coronary artery intervention of the mid circumflex with a 3.5 x 26 mm drug-eluting stent. BP elevated started on Coreg and Cozaar. Met with patient at bedside to discuss transition of care. He lives with his spouse in a 2 story home and was independent PTA. His PCP is Dr. Napoleon Quan and he uses [facility] on [address]. No anticipated discharge need identified at this time. CM./Sw will continue to follow.

## 2022-12-20 ENCOUNTER — TELEPHONE (OUTPATIENT)
Dept: CARDIAC CATH/INVASIVE PROCEDURES | Age: 57
End: 2022-12-20

## 2022-12-20 NOTE — TELEPHONE ENCOUNTER
Spoke with Mr Diego Boston re: his recent STEMI s/p PCI. States he is feeling well with no CP or SOB. His radial site Is healing well with just some diffuse ecchymosis but no s/s of hematoma. He is being compliant with all of his meds especially his DAPT. I emphasized the importance of no interruption in therapy and he understands. I asked how his BPs have been but he is not currently checking thm but states he will now. He f/u with his PCP tomorrow and Dr Valentina Muhammad on Friday. He had no further questions. I mailed out more education about CAD/RAMONA. He was grateful for the call. Patient Education:  Post PCI, Risk Factors, Recovery, Prevention for Future, Lifestyle Changes     Mailed patient Education Packet to review information relating to current diagnosis, lifestyle Modification, and medications. The packet contains information on the follow topics as they relate to the patient specifically. 1. CAD & Coronary Stents- A&P, cardiac cath, equipment used, heart anatomy  2. HTN- what is blood pressure, normals, how to manage BP/lifestyle changes  3. HLD-cholesterol, types, where it comes from, nutrition counseling  4. Diabetes Education- discussed cardiovascular effects, packet given for Hospital Education classes included if applicable. 5. Smoking cessation- nicotine effects on body and stents, how to quit, tobacco treatment center brochure included if applicable. N/A  6. Active lifestyle suggestions- why activity and exercise are recommended, suggestions to start, Cardiac Rehabilitation benefits  7. Healthy eating- tips to help with Blood pressure and cholesterol management, eating regular meals, alternative food choices. 8. Stress management techniques  9. Post hospital follow up visit with PCP and cardiology encouraged  10. Medication Information- aspirin, P2Y12,, statins- what they are indicated for and importance of compliance.   Patient information card given to patient to keep in wallet or pocket for record keeping on medical history, doctor names and numbers, medication list.      Letter included with explanation of packet and my contact information and encouraged to call with questions they may have about information received.      SALIMA LoomisN, CV-BC, RN  RN Navigator - Cath/PCI/STEMI  [address]

## 2022-12-20 NOTE — PLAN OF CARE
Problem: Discharge Planning  Goal: Discharge to home or other facility with appropriate resources  12/19/2022 1947 by Yamileth Potter RN  Outcome: Adequate for Discharge  12/19/2022 1947 by Yamileth Potter RN  Outcome: Adequate for Discharge     Problem: ABCDS Injury Assessment  Goal: Absence of physical injury  12/19/2022 1947 by Yamileth Potter RN  Outcome: Adequate for Discharge  12/19/2022 1947 by Yamileth Potter RN  Outcome: Adequate for Discharge

## 2022-12-22 ENCOUNTER — HOSPITAL ENCOUNTER (OUTPATIENT)
Age: 57
Discharge: HOME | End: 2022-12-22
Payer: COMMERCIAL

## 2022-12-22 DIAGNOSIS — I25.5 ISCHEMIC CARDIOMYOPATHY: ICD-10-CM

## 2022-12-22 DIAGNOSIS — I21.21 ST ELEVATION MYOCARDIAL INFARCTION INVOLVING LEFT CIRCUMFLEX CORONARY ARTERY (HCC): ICD-10-CM

## 2022-12-22 LAB
ANION GAP SERPL CALCULATED.3IONS-SCNC: 9 MMOL/L (ref 7–16)
BUN BLDV-MCNC: 37 MG/DL (ref 6–20)
CALCIUM SERPL-MCNC: 9.4 MG/DL (ref 8.6–10.2)
CHLORIDE BLD-SCNC: 102 MMOL/L (ref 98–107)
CO2: 25 MMOL/L (ref 22–29)
CREAT SERPL-MCNC: 2.1 MG/DL (ref 0.7–1.2)
GFR SERPL CREATININE-BSD FRML MDRD: 36 ML/MIN/1.73
GLUCOSE BLD-MCNC: 95 MG/DL (ref 74–99)
POTASSIUM SERPL-SCNC: 4.2 MMOL/L (ref 3.5–5)
SODIUM BLD-SCNC: 136 MMOL/L (ref 132–146)

## 2022-12-22 PROCEDURE — 36415 COLL VENOUS BLD VENIPUNCTURE: CPT

## 2022-12-22 PROCEDURE — 80048 BASIC METABOLIC PNL TOTAL CA: CPT

## 2022-12-23 ENCOUNTER — OFFICE VISIT (OUTPATIENT)
Dept: CARDIOLOGY CLINIC | Age: 57
End: 2022-12-23

## 2022-12-23 VITALS
SYSTOLIC BLOOD PRESSURE: 90 MMHG | HEART RATE: 79 BPM | BODY MASS INDEX: 30.94 KG/M2 | DIASTOLIC BLOOD PRESSURE: 68 MMHG | WEIGHT: 216.1 LBS | RESPIRATION RATE: 16 BRPM | HEIGHT: 70 IN | OXYGEN SATURATION: 98 %

## 2022-12-23 DIAGNOSIS — I25.10 CORONARY ARTERY DISEASE DUE TO LIPID RICH PLAQUE: ICD-10-CM

## 2022-12-23 DIAGNOSIS — I25.5 ISCHEMIC CARDIOMYOPATHY: Primary | ICD-10-CM

## 2022-12-23 DIAGNOSIS — I25.83 CORONARY ARTERY DISEASE DUE TO LIPID RICH PLAQUE: ICD-10-CM

## 2022-12-23 RX ORDER — PRASUGREL 10 MG/1
10 TABLET, FILM COATED ORAL DAILY
Qty: 90 TABLET | Refills: 3 | Status: SHIPPED | OUTPATIENT
Start: 2022-12-23

## 2022-12-23 RX ORDER — M-VIT,TX,IRON,MINS/CALC/FOLIC 27MG-0.4MG
1 TABLET ORAL DAILY
COMMUNITY

## 2022-12-23 NOTE — PROGRESS NOTES
Kim Humphries  1965  Date of Service: 12/23/2022    Patient Active Problem List    Diagnosis Date Noted    Ischemic cardiomyopathy 12/19/2022     Priority: Medium    Stage 3a chronic kidney disease ([facility] [address] 12/19/2022     Priority: Medium    Primary hypertension 12/19/2022     Priority: Medium    STEMI (ST elevation myocardial infarction) ([facility] [address] 12/17/2022     Priority: Medium       Social History     Socioeconomic History    Marital status:      Spouse name: Nicole Fajardo    Number of children: 2    Years of education: 13    Highest education level: None   Tobacco Use    Smoking status: Never    Smokeless tobacco: Never   Vaping Use    Vaping Use: Never used   Substance and Sexual Activity    Alcohol use: Yes     Alcohol/week: 4.0 standard drinks     Types: 4 Cans of beer per week     Comment: occassional    Drug use: Never    Sexual activity: Yes     Partners: Female       Current Outpatient Medications   Medication Sig Dispense Refill    Cholecalciferol (VITAMIN D3) 125 MCG (5000 UT) TABS Take by mouth      Multiple Vitamins-Minerals (THERAPEUTIC MULTIVITAMIN-MINERALS) tablet Take 1 tablet by mouth daily      aspirin 81 MG EC tablet Take 1 tablet by mouth daily 180 tablet 3    carvedilol (COREG) 6.25 MG tablet Take 1 tablet by mouth 2 times daily (with meals) 180 tablet 3    rosuvastatin (CRESTOR) 40 MG tablet Take 1 tablet by mouth nightly 90 tablet 3    ticagrelor (BRILINTA) 90 MG TABS tablet Take 1 tablet by mouth 2 times daily 180 tablet 3    lisinopril (PRINIVIL;ZESTRIL) 10 MG tablet Take 1 tablet by mouth daily 90 tablet 3     No current facility-administered medications for this visit. No Known Allergies    Chief Complaint:  Kim Humphries is here today for follow up and management/recomendations for coronary artery disease. History of Present Illness: Kim Humphries is being seen today for initial hospital follow-up.   He was just discharged 4 days ago with an acute inferior STEMI. He underwent stenting with a 3.5 x 26 mm drug-eluting stent to the circumflex with good results. His echo showed posterior hypokinesis with an ejection fraction 45%. Since his discharge he has been walking around the house, going shopping, and going up stairs. He states that he has more dyspnea with exertion than he is used to. However, he states that it is not all of the time. He denies any chest discomfort, orthopnea/PND, or lower extremity edema. He denies any palpitations or presyncopal symptoms. REVIEW OF SYSTEMS:  As above. Patient does not complain of any fever, chills, nausea, vomiting or diarrhea. No focal, motor or neurological deficits. No changes in his/her vision, hearing, bowel or bladder habits. He is not known to have a history of thyroid problems. No recent nose bleeds. PHYSICAL EXAM:  Vitals:    12/23/22 0946   BP: 90/68   Pulse: 79   Resp: 16   SpO2: 98%   Weight: 216 lb 1.6 oz (98 kg)   Height: 5' 10\" (1.778 m)       GENERAL:  He is alert and oriented x 3, communicates well, in no distress. NECK:  No masses, trachea is mid position. Supple, full ROM, no JVD or bruits. No palpable thyromegaly or lymphadenopathy. HEART:  Regular rate and rhythm. Normal S1 and S2. There are no abnormal murmurs or gallops. LUNGS:  Clear to auscultation bilaterally. No use of accessory muscles. symmetrical excursion. ABDOMEN:  Soft, non-tender. Normal bowel sounds. EXTREMITIES:  Full ROM x 4. No bilateral lower extremity edema. Good distal pulses. EYES:  Extraocular muscles intact. PERRL. Normal lids & conjunctiva. ENT:  Nares are clear & not bleeding. Moist mucosa. Normal lips formation. No external masses   NEURO: no tremors, full ROM x 4, EOMI. SKIN:  Warm, dry and intact. Normal turgor. EKG: Sinus rhythm, 79 bpm, nl axis, nonspecific ST - T wave changes. Q waves in leads II and III. Assessment:   Coronary artery disease as outlined above.     Dyspnea on exertion and shortness of breath. Possibly due to the [address]. He is euvolemic on today's exam.  Cardiomyopathy as outlined above. Clinically euvolemic and no decompensation at this time. Chronic renal insufficiency. Hypertension, well controled at this time. Hypercholesterolemia        Recommendations:  Continue to follow the cholesterol with Dr. Alyssa Blankenship. I discussed the above with him. I will change the Brilinta to Effient. I would wait another week until the dyspnea improves to begin cardiac rehab. I instructed him to wait until a week from Monday for this. He was asking about returning to work. 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 I would like to reevaluate him in 1 month before resuming this level of activities. We reviewed his hospital course and test results, ejection fraction, stent results, medications, etc.      Thank you for allowing me to participate in your patient's care. [address], [address] [facility]  Interventional Cardiology    Note: This report was completed using computerized voice recognition software. Every effort has been made to ensure accuracy, however; and invert and computerized transcription errors may be present.

## 2023-01-04 ENCOUNTER — TELEPHONE (OUTPATIENT)
Dept: CARDIAC REHAB | Age: 58
End: 2023-01-04

## 2023-01-05 DIAGNOSIS — I21.3 ST ELEVATION MYOCARDIAL INFARCTION (STEMI), UNSPECIFIED ARTERY (HCC): Primary | ICD-10-CM

## 2023-01-11 ENCOUNTER — HOSPITAL ENCOUNTER (OUTPATIENT)
Dept: CARDIAC REHAB | Age: 58
Setting detail: THERAPIES SERIES
Discharge: HOME | End: 2023-01-11
Payer: COMMERCIAL

## 2023-01-11 ENCOUNTER — TELEPHONE (OUTPATIENT)
Dept: CARDIOLOGY CLINIC | Age: 58
End: 2023-01-11

## 2023-01-11 VITALS — HEIGHT: 70 IN | WEIGHT: 212 LBS | BODY MASS INDEX: 30.35 KG/M2 | RESPIRATION RATE: 20 BRPM

## 2023-01-11 PROCEDURE — 93797 PHYS/QHP OP CAR RHAB WO ECG: CPT

## 2023-01-11 PROCEDURE — 93798 PHYS/QHP OP CAR RHAB W/ECG: CPT

## 2023-01-11 ASSESSMENT — PATIENT HEALTH QUESTIONNAIRE - PHQ9
SUM OF ALL RESPONSES TO PHQ9 QUESTIONS 1 & 2: 0
SUM OF ALL RESPONSES TO PHQ QUESTIONS 1-9: 0
2. FEELING DOWN, DEPRESSED OR HOPELESS: 0
1. LITTLE INTEREST OR PLEASURE IN DOING THINGS: 0
SUM OF ALL RESPONSES TO PHQ QUESTIONS 1-9: 0

## 2023-01-11 ASSESSMENT — EXERCISE STRESS TEST
PEAK_HR: 93
PEAK_RPE: 11
PEAK_BP: 130/76
PEAK_BP: 130/76

## 2023-01-11 ASSESSMENT — EJECTION FRACTION: EF_VALUE: 45

## 2023-01-11 NOTE — TELEPHONE ENCOUNTER
It is okay with cardiology to proceed with a dental visit and cleaning, etc.  They just cannot hold any of the cardiac medications including any doses of the Effient or aspirin.

## 2023-01-11 NOTE — CARDIO/PULMONARY
PT. SEEN IN CARDIAC REHAB TODAY FOR INITIAL EVALUATION AND EXERCISE. PT. IS S/P STEMI WITH A STENT PLACEMENT ON 12-17-22. PT HAS A HX OF POLYCYSTIC KIDNEY DISEASE. HE DOES NOT HAVE ANY EXERCISE LIMITATIONS OR EQUIPMENT LIMITATIONS. NO SWELLING OF HIS FEET OR ANKLES. HIS JOB REQUIRES HIM BACK TO WOR REQUIREMENTS REGARDING PHYSICAL ACTIVITY AND WEIGHT RESTRICTIONS. HE WAS EXERCISING PRIOR TO HIS HEART PROBLEM. PHQ9 SCORE IS A 0. DENIES S/SOF DEPRESSION. FALLS RISK IS A 2/8 LOW. HIS GOAL IS TO MEET WORK REQUIREMENTS AND GET BACK TO WORK.

## 2023-01-11 NOTE — PROGRESS NOTES
01/11/23 1213   Treatment Diagnosis   Treatment Diagnosis 1 PCI   PCI Date 12/17/22   STEMI Date 12/17/22   Referral Date 01/05/22   Oxygen Saturation / Titration    Stages of Change  Maintenance   Oxygen Intervention   Oxygen Use No   Individual Treatment Plan   ITP Visit Type Initial assessment   1st Date of Exercise  01/11/23   ITP Next Review Date   (final/DC will complete another ITP)   Visit #/Total Visits 2/36   EF% 45 %   Risk Stratification Moderate   ITP Exercise;Psychosocial;Education;Nutrition; Tobacco   Exercise    Stages of Change Action   Test Six minute walk test   Assisted Devices None   Data Measured Before Walk   Heart Rate 65   Blood Pressure 122/85   O2 Saturation 100   O2 Device Room air   RPE 6   Data Measured during Walk   Indicate Mode of RPE 6 minutes   RPE Data Measured During   Treadmill Speed 3.3 mph   Treadmill Grade 0 %   Symptoms   (none)   Any problems while exercising none   Do You Have Shortness of Breath No   Describe Type of Pain You Are Having none   Peak RPE 11   Data Measured Immediately After Walk   Distance Walked in  [employer] (miles) 0.29   Distance Walked in Feet (Calculated) 1531.2 ft   Distance Walked (ft) 1531.2 ft   Peak Heart Rate 93   Peak Blood Pressure 130/76   RPE 11   O2 Saturation 95   Data Measured at 5 Minutes After Walk   Heart Rate 68   Blood Pressure 114/68   SpO2 98 %   Comments pt achieved a MET level of 3.6 during pre-TM walk test.   Exercise Prescription   Mode Treadmill;Bike;Stepper;Ergometer;Elliptical   Frequency per week 3   Duration Per Session 30-60   Intensity METS       3-7  (met is at 2.7-4.6 METs. Goal to work up to 4-7 METs)   RPE 11-15   Progression work up to 60 min at 3-7METs (mod-vig intensity) based on work requirments and by the end of 36 sessions.    Symptoms with Exercise   (none)   Target Heart Rate 100-133  (UNM -0%)   Resistance Training Yes   Exercise Blood Pressures   Resting /85   Peak /76   Is BP WDL No Exercise Activity at Home   Type walking  (has to do a lot with work)   Frequency daily   Duration 15+   Resistance Training No   Exercise Education   Education Self pulse;Exercise safety;Signs/symptoms to report;RPE scale;Equipment orientation; Warm up/cool down;Physically active   Exercise Target Goal   Target Goal(s) Individual exercise RX; Aerobic activity 30 + minutes/day  5 days/week   Patient Stated Exercise Goals Wants to get in shape for going back to work. Psychosocial   Stages of Change Contemplate;Preparation;Maintenance; Action   PHQ-9 Total Score 0   Psychosocial Intervention   Interventions No intervention indicated   Consults   (NONE)   Currently Taking Psychotropic Meds No   Medication Changes No   Psychosocial Education   Education Cardiac meds; Coping techniques;Relaxation techniques;Signs/symptoms of depression   Psychosocial Target Goals   Target Goal(s) Assess presence or absence of depression using a valid screening tool;Maximizes coping skills   Uses Stress Mgmt Techniques Yes   Patient Stated Psychosocial Goals NO S/S OF DEPRESSION,  LOWER STRESS LEVEL   Tobacco   Stages of Change Maintenance   Tobacco Use No   Tobacco Cessation Intervention   Smoking Cessation Referral No   Tobacco Adjunct No   Tobacco Education   Resource Information Provided No   Tobacco Triggers none   Target Goal   Target Goal   (N/A)   Patient Stated Tobacco Goals N/A   Nutrition   Stages of Change Contemplate;Preparation;Maintenance; Action   Diabetes No   Lipids   Date Lipids Drawn 12/18/22   Total 202   HDL 44   LDL 85   Triglycerides 365   Lipid Med(s) CRESTOR   Lipid Med Change(s) No   Weight Management   Weight Goal Pt has to lose weight for work. long term goal to get to 202-200lbs. Alcohol None   Weight  212 lb (96.2 kg)   Height  5' 10\" (1.778 m)   BMI 30.48   Nutrition Intervention   Dietitian Consult No  (TBD, No dietitian at facility at this time.  working on getting a new one.)   Nurse/Patient Discussion Yes  (HEART HEALTHY HANDOUTS GIVEN AND REVIEWED WITH PT.)   Nutrition Goals ADHERE TO CARDIAC DIET   Supplemental Nutrition none   Nutrition Class No   Diabetes Education Referral No   Lipid Clinic Referral No   Weight Management Referral No   Nutrition Education   Education Signs/symptoms/treatment of hypo/hyperglycemia; Low sodium diet; Low saturated fat diet; Overall healthy eating pattern that emphasizes vegetables, fruits, wholegrains, healthy proteins and non-tropical oils; Reduced calorie/portion controlled diet;Limit added sugars   Nutrition Target Goals   Target Goals LDL-C less than 70 and non-HDL-C <100 for those with ASCVD;Triglycerides less than 150   Patient Stated Nutrition Goals wants to learn to eat healthier, needs to get down to 202lbs or less for work. Education   Learning Barrier Ready to learn   Cardiac Knowledge Total Score 10   Education Intervention   Education Schedule Given Yes   Patient Education    Education CAD;Risk factors;Med compliance;Cardiac A&P;Signs/Symptoms of angina   Hypertension Yes   Hypertension Controlled Yes   Is BP WDL No   Med(s) Change No   BP Meds LISINOPRIL  COREG   ACE/ARB Prescribed Yes   ACE/ARB Adherent Yes   ASA Prescribed Yes   ASA Adherent Yes   Antiplatelet Prescribed Yes   Antiplatelet Adherent Yes   BB Prescribed Yes   BB Adherent Yes   Statins Prescribed Yes   Statins Adherent Yes   Statin Intensity High   Education Target Goals   Target Goals Medication compliance; Risk factors; Understand target guidelines for lipids; Understand target guidelines for B/P   Patient Stated Education Goals MED COMPLIANT   Staff Treatment Goals   Goals Exercise;Education   Exercise Goal work up to 60 min at 3-7METs (mod-vig intensity) based on work requirments and by the end of 36 sessions.    Exercise Goal Status Progressing   Exercise Goal Assessment Recommendations;Frequency/duration   Education Goal Improve overall cardiac knowledge on diet, exercise, and risk factors by the end of 36 sessions/time in program.   Education Goal Status Progressing   Education Goal Assessment Recommendations;Treatment time frame

## 2023-01-11 NOTE — TELEPHONE ENCOUNTER
- Message -----       From:Miguel Corbett       Sent:1/11/2023  2:04 PM EST         To:Dr. Arnulfo Rico    Subject:Dental Appointment scheduled for 1-    Dr. Jameel Johnson office was requesting I contact you to make sure it was not too soon for a dental visit. I gave them my list of new medications and they are OK with it for a standard teeth cleaning. No other procedure scheduled they just werent sure if it was too soon for you.

## 2023-01-13 ENCOUNTER — HOSPITAL ENCOUNTER (OUTPATIENT)
Dept: CARDIAC REHAB | Age: 58
Setting detail: THERAPIES SERIES
Discharge: HOME | End: 2023-01-13
Payer: COMMERCIAL

## 2023-01-13 PROCEDURE — 93798 PHYS/QHP OP CAR RHAB W/ECG: CPT

## 2023-01-16 ENCOUNTER — HOSPITAL ENCOUNTER (OUTPATIENT)
Dept: CARDIAC REHAB | Age: 58
Setting detail: THERAPIES SERIES
Discharge: HOME | End: 2023-01-16
Payer: COMMERCIAL

## 2023-01-16 PROCEDURE — 93798 PHYS/QHP OP CAR RHAB W/ECG: CPT

## 2023-01-18 ENCOUNTER — HOSPITAL ENCOUNTER (OUTPATIENT)
Dept: CARDIAC REHAB | Age: 58
Setting detail: THERAPIES SERIES
Discharge: HOME | End: 2023-01-18
Payer: COMMERCIAL

## 2023-01-18 PROCEDURE — 93798 PHYS/QHP OP CAR RHAB W/ECG: CPT

## 2023-01-20 ENCOUNTER — HOSPITAL ENCOUNTER (OUTPATIENT)
Dept: CARDIAC REHAB | Age: 58
Setting detail: THERAPIES SERIES
Discharge: HOME | End: 2023-01-20
Payer: COMMERCIAL

## 2023-01-20 PROCEDURE — 93798 PHYS/QHP OP CAR RHAB W/ECG: CPT

## 2023-01-23 ENCOUNTER — HOSPITAL ENCOUNTER (OUTPATIENT)
Dept: CARDIAC REHAB | Age: 58
Setting detail: THERAPIES SERIES
Discharge: HOME | End: 2023-01-23
Payer: COMMERCIAL

## 2023-01-23 PROCEDURE — 93798 PHYS/QHP OP CAR RHAB W/ECG: CPT

## 2023-01-25 ENCOUNTER — HOSPITAL ENCOUNTER (OUTPATIENT)
Dept: CARDIAC REHAB | Age: 58
Setting detail: THERAPIES SERIES
Discharge: HOME | End: 2023-01-25
Payer: COMMERCIAL

## 2023-01-25 PROCEDURE — 93798 PHYS/QHP OP CAR RHAB W/ECG: CPT

## 2023-01-27 ENCOUNTER — APPOINTMENT (OUTPATIENT)
Dept: CARDIAC REHAB | Age: 58
End: 2023-01-27
Payer: COMMERCIAL

## 2023-01-27 ENCOUNTER — HOSPITAL ENCOUNTER (OUTPATIENT)
Dept: CARDIAC REHAB | Age: 58
Setting detail: THERAPIES SERIES
Discharge: HOME | End: 2023-01-27
Payer: COMMERCIAL

## 2023-01-27 PROCEDURE — 93798 PHYS/QHP OP CAR RHAB W/ECG: CPT

## 2023-01-30 ENCOUNTER — HOSPITAL ENCOUNTER (OUTPATIENT)
Dept: CARDIAC REHAB | Age: 58
Setting detail: THERAPIES SERIES
End: 2023-01-30
Payer: COMMERCIAL

## 2023-02-01 ENCOUNTER — HOSPITAL ENCOUNTER (OUTPATIENT)
Dept: CARDIAC REHAB | Age: 58
Setting detail: THERAPIES SERIES
Discharge: HOME | End: 2023-02-01
Payer: COMMERCIAL

## 2023-02-01 PROCEDURE — 93798 PHYS/QHP OP CAR RHAB W/ECG: CPT

## 2023-02-03 ENCOUNTER — HOSPITAL ENCOUNTER (OUTPATIENT)
Dept: CARDIAC REHAB | Age: 58
Setting detail: THERAPIES SERIES
Discharge: HOME | End: 2023-02-03
Payer: COMMERCIAL

## 2023-02-03 PROCEDURE — 93798 PHYS/QHP OP CAR RHAB W/ECG: CPT

## 2023-02-06 ENCOUNTER — HOSPITAL ENCOUNTER (OUTPATIENT)
Dept: CARDIAC REHAB | Age: 58
Setting detail: THERAPIES SERIES
Discharge: HOME | End: 2023-02-06
Payer: COMMERCIAL

## 2023-02-06 PROCEDURE — 93798 PHYS/QHP OP CAR RHAB W/ECG: CPT

## 2023-02-08 ENCOUNTER — HOSPITAL ENCOUNTER (OUTPATIENT)
Dept: CARDIAC REHAB | Age: 58
Setting detail: THERAPIES SERIES
Discharge: HOME | End: 2023-02-08
Payer: COMMERCIAL

## 2023-02-08 PROCEDURE — 93798 PHYS/QHP OP CAR RHAB W/ECG: CPT

## 2023-02-10 ENCOUNTER — HOSPITAL ENCOUNTER (OUTPATIENT)
Dept: CARDIAC REHAB | Age: 58
Setting detail: THERAPIES SERIES
Discharge: HOME | End: 2023-02-10
Payer: COMMERCIAL

## 2023-02-10 PROCEDURE — 93798 PHYS/QHP OP CAR RHAB W/ECG: CPT

## 2023-02-13 ENCOUNTER — HOSPITAL ENCOUNTER (OUTPATIENT)
Dept: CARDIAC REHAB | Age: 58
Setting detail: THERAPIES SERIES
Discharge: HOME | End: 2023-02-13
Payer: COMMERCIAL

## 2023-02-13 PROCEDURE — 93798 PHYS/QHP OP CAR RHAB W/ECG: CPT

## 2023-02-14 ENCOUNTER — OFFICE VISIT (OUTPATIENT)
Dept: CARDIOLOGY CLINIC | Age: 58
End: 2023-02-14
Payer: COMMERCIAL

## 2023-02-14 VITALS
WEIGHT: 218.4 LBS | SYSTOLIC BLOOD PRESSURE: 110 MMHG | HEART RATE: 59 BPM | BODY MASS INDEX: 31.26 KG/M2 | HEIGHT: 70 IN | RESPIRATION RATE: 16 BRPM | DIASTOLIC BLOOD PRESSURE: 78 MMHG

## 2023-02-14 DIAGNOSIS — I25.5 ISCHEMIC CARDIOMYOPATHY: Primary | ICD-10-CM

## 2023-02-14 PROCEDURE — 99214 OFFICE O/P EST MOD 30 MIN: CPT | Performed by: INTERNAL MEDICINE

## 2023-02-14 PROCEDURE — 3074F SYST BP LT 130 MM HG: CPT | Performed by: INTERNAL MEDICINE

## 2023-02-14 PROCEDURE — 3078F DIAST BP <80 MM HG: CPT | Performed by: INTERNAL MEDICINE

## 2023-02-14 PROCEDURE — 93000 ELECTROCARDIOGRAM COMPLETE: CPT | Performed by: INTERNAL MEDICINE

## 2023-02-14 RX ORDER — CARVEDILOL 3.12 MG/1
3.12 TABLET ORAL 2 TIMES DAILY WITH MEALS
Qty: 180 TABLET | Refills: 3 | Status: SHIPPED | OUTPATIENT
Start: 2023-02-14

## 2023-02-14 NOTE — PROGRESS NOTES
Lori Archibald  1965  Date of Service: 2/14/2023    Patient Active Problem List    Diagnosis Date Noted    Coronary artery disease due to lipid rich plaque 12/23/2022     Priority: Medium     Overview Note:     Inferior STEMI 12/17/2022. 3.5 x 26 mm drug-eluting stent to circumflex 12/17/2022. Ischemic cardiomyopathy 12/19/2022     Priority: Medium     Overview Note:     EF 45%      Stage 3a chronic kidney disease ([facility] [address] 12/19/2022     Priority: Medium    Primary hypertension 12/19/2022     Priority: Medium    STEMI (ST elevation myocardial infarction) ([facility] [address] 12/17/2022     Priority: Medium       Social History     Socioeconomic History    Marital status:      Spouse name: Naldo Doherty    Number of children: 2    Years of education: 13   Tobacco Use    Smoking status: Never    Smokeless tobacco: Never   Vaping Use    Vaping Use: Never used   Substance and Sexual Activity    Alcohol use: Yes     Alcohol/week: 4.0 standard drinks     Types: 4 Cans of beer per week     Comment: occassional    Drug use: Never    Sexual activity: Yes     Partners: Female       Current Outpatient Medications   Medication Sig Dispense Refill    Cholecalciferol (VITAMIN D3) 125 MCG (5000 UT) TABS Take by mouth      Multiple Vitamins-Minerals (THERAPEUTIC MULTIVITAMIN-MINERALS) tablet Take 1 tablet by mouth daily      prasugrel (EFFIENT) 10 MG TABS Take 1 tablet by mouth daily 90 tablet 3    aspirin 81 MG EC tablet Take 1 tablet by mouth daily 180 tablet 3    carvedilol (COREG) 6.25 MG tablet Take 1 tablet by mouth 2 times daily (with meals) 180 tablet 3    rosuvastatin (CRESTOR) 40 MG tablet Take 1 tablet by mouth nightly 90 tablet 3    lisinopril (PRINIVIL;ZESTRIL) 10 MG tablet Take 1 tablet by mouth daily 90 tablet 3     No current facility-administered medications for this visit.         No Known Allergies    Chief Complaint:  Lori Archibald is here today for follow up and management/recomendations for coronary artery disease. History of Present Illness: Mu Salguero has been going up and down stairs, doing household chores, and shopping. He has also been exercising at cardiac rehab. He denies any chest discomfort or dyspnea while performing any of these activities. However, occasionally at home after he is done some activities he feels short of breath. He also has some fatigue. He states that he has noticed when he feels this way his systolic blood pressure is in the 80s and 90s. He states that Dr. Ladonna Alberts just decreased his lisinopril to 10 mg 1 week ago due to this. He cannot tell if he notices much difference yet. He denies any orthopnea/PND, or lower extremity edema. He denies any palpitations or presyncopal symptoms. REVIEW OF SYSTEMS:  As above. Patient does not complain of any fever, chills, nausea, vomiting or diarrhea. No focal, motor or neurological deficits. No changes in his/her vision, hearing, bowel or bladder habits. He is not known to have a history of thyroid problems. No recent nose bleeds. PHYSICAL EXAM:  Vitals:    02/14/23 0917   BP: 110/78   Pulse: 59   Resp: 16   Weight: 218 lb 6.4 oz (99.1 kg)   Height: 5' 10\" (1.778 m)       GENERAL:  He is alert and oriented x 3, communicates well, in no distress. NECK:  No masses, trachea is mid position. Supple, full ROM, no JVD or bruits. No palpable thyromegaly or lymphadenopathy. HEART:  Regular slow rhythm. Normal S1 and S2. There are no abnormal murmurs or gallops. LUNGS:  Clear to auscultation bilaterally. No use of accessory muscles. symmetrical excursion. Good air movement. ABDOMEN:  Soft, non-tender. Normal bowel sounds. EXTREMITIES:  Full ROM x 4. No bilateral lower extremity edema on exam.  Good distal pulses. EYES:  Extraocular muscles intact. PERRL. Normal lids & conjunctiva. ENT:  Nares are clear & not bleeding. Moist mucosa. Normal lips formation.   No external masses   NEURO: no tremors, full ROM x 4, EOMI. SKIN:  Warm, dry and intact. Normal turgor. EKG: Sinus bradycardia, 59 bpm, nl axis, nonspecific ST - T wave changes. Q waves in leads II and III. Assessment:   Coronary artery disease as outlined above. Persistent dyspnea on exertion as above. Episodes of hypotension that may correspond to his dyspnea and probable fatigue. He also has mild bradycardia which she may not be accustomed to as well. Cardiomyopathy as outlined above. Clinically euvolemic and no decompensation at this time. Chronic renal insufficiency. Hypertension, well controled at this time. He reports low blood pressures at home which may correspond to his symptoms. He blood pressure appears to be improving. Hypercholesterolemia        Recommendations:  Continue to follow the cholesterol with Dr. Annmarie Larry. I agree with decreasing the lisinopril. I will also decrease the Coreg. He also reports that he used to drink a lot of caffeine and he is not drinking none. This may be playing some role as well. This may just require time. Thank you for allowing me to participate in your patient's care. [address], [address] [facility]  Interventional Cardiology    Note: This report was completed using computerized voice recognition software. Every effort has been made to ensure accuracy, however; and invert and computerized transcription errors may be present.

## 2023-02-15 ENCOUNTER — HOSPITAL ENCOUNTER (OUTPATIENT)
Dept: CARDIAC REHAB | Age: 58
Setting detail: THERAPIES SERIES
Discharge: HOME | End: 2023-02-15
Payer: COMMERCIAL

## 2023-02-15 PROCEDURE — 93798 PHYS/QHP OP CAR RHAB W/ECG: CPT

## 2023-02-17 ENCOUNTER — HOSPITAL ENCOUNTER (OUTPATIENT)
Dept: CARDIAC REHAB | Age: 58
Setting detail: THERAPIES SERIES
End: 2023-02-17
Payer: COMMERCIAL

## 2023-03-17 LAB
ALBUMIN SERPL-MCNC: 4.4 G/DL (ref 3.5–5.2)
ALP SERPL-CCNC: 58 U/L (ref 40–129)
ALT SERPL-CCNC: 37 U/L (ref 0–40)
ANION GAP SERPL CALCULATED.3IONS-SCNC: 14 MMOL/L (ref 7–16)
AST SERPL-CCNC: 25 U/L (ref 0–39)
BASOPHILS # BLD: 0.02 E9/L (ref 0–0.2)
BASOPHILS NFR BLD: 0.4 % (ref 0–2)
BILIRUB SERPL-MCNC: 0.6 MG/DL (ref 0–1.2)
BUN SERPL-MCNC: 35 MG/DL (ref 6–20)
CALCIUM SERPL-MCNC: 9.4 MG/DL (ref 8.6–10.2)
CHLORIDE SERPL-SCNC: 108 MMOL/L (ref 98–107)
CHOLESTEROL, TOTAL: 153 MG/DL (ref 0–199)
CO2 SERPL-SCNC: 22 MMOL/L (ref 22–29)
CREAT SERPL-MCNC: 2 MG/DL (ref 0.7–1.2)
EOSINOPHIL # BLD: 0.05 E9/L (ref 0.05–0.5)
EOSINOPHIL NFR BLD: 0.9 % (ref 0–6)
ERYTHROCYTE [DISTWIDTH] IN BLOOD BY AUTOMATED COUNT: 12.6 FL (ref 11.5–15)
GLUCOSE SERPL-MCNC: 99 MG/DL (ref 74–99)
HCT VFR BLD AUTO: 48.9 % (ref 37–54)
HDLC SERPL-MCNC: 58 MG/DL
HGB BLD-MCNC: 16.3 G/DL (ref 12.5–16.5)
IMM GRANULOCYTES # BLD: 0.03 E9/L
IMM GRANULOCYTES NFR BLD: 0.6 % (ref 0–5)
LDLC SERPL CALC-MCNC: 63 MG/DL (ref 0–99)
LYMPHOCYTES # BLD: 1.28 E9/L (ref 1.5–4)
LYMPHOCYTES NFR BLD: 23.8 % (ref 20–42)
MCH RBC QN AUTO: 29.3 PG (ref 26–35)
MCHC RBC AUTO-ENTMCNC: 33.3 % (ref 32–34.5)
MCV RBC AUTO: 87.8 FL (ref 80–99.9)
MONOCYTES # BLD: 0.51 E9/L (ref 0.1–0.95)
MONOCYTES NFR BLD: 9.5 % (ref 2–12)
NEUTROPHILS # BLD: 3.49 E9/L (ref 1.8–7.3)
NEUTS SEG NFR BLD: 64.8 % (ref 43–80)
PLATELET # BLD AUTO: 176 E9/L (ref 130–450)
PMV BLD AUTO: 9.6 FL (ref 7–12)
POTASSIUM SERPL-SCNC: 4.2 MMOL/L (ref 3.5–5)
PROT SERPL-MCNC: 7 G/DL (ref 6.4–8.3)
RBC # BLD AUTO: 5.57 E12/L (ref 3.8–5.8)
SODIUM SERPL-SCNC: 144 MMOL/L (ref 132–146)
TRIGL SERPL-MCNC: 160 MG/DL (ref 0–149)
VLDLC SERPL CALC-MCNC: 32 MG/DL
WBC # BLD: 5.4 E9/L (ref 4.5–11.5)

## 2023-05-06 ASSESSMENT — EJECTION FRACTION: EF_VALUE: 45

## 2023-05-10 RX ORDER — ACETAMINOPHEN/DIPHENHYDRAMINE 500MG-25MG
TABLET ORAL
Qty: 180 TABLET | Refills: 3 | Status: SHIPPED | OUTPATIENT
Start: 2023-05-10

## 2023-07-17 ENCOUNTER — OFFICE VISIT (OUTPATIENT)
Dept: CARDIOLOGY CLINIC | Age: 58
End: 2023-07-17
Payer: COMMERCIAL

## 2023-07-17 VITALS
BODY MASS INDEX: 30.49 KG/M2 | RESPIRATION RATE: 18 BRPM | HEIGHT: 70 IN | HEART RATE: 63 BPM | WEIGHT: 213 LBS | SYSTOLIC BLOOD PRESSURE: 102 MMHG | DIASTOLIC BLOOD PRESSURE: 74 MMHG

## 2023-07-17 DIAGNOSIS — I25.5 ISCHEMIC CARDIOMYOPATHY: Primary | ICD-10-CM

## 2023-07-17 PROCEDURE — 99214 OFFICE O/P EST MOD 30 MIN: CPT | Performed by: INTERNAL MEDICINE

## 2023-07-17 PROCEDURE — 3074F SYST BP LT 130 MM HG: CPT | Performed by: INTERNAL MEDICINE

## 2023-07-17 PROCEDURE — 93000 ELECTROCARDIOGRAM COMPLETE: CPT | Performed by: INTERNAL MEDICINE

## 2023-07-17 PROCEDURE — 3078F DIAST BP <80 MM HG: CPT | Performed by: INTERNAL MEDICINE

## 2023-07-17 RX ORDER — LISINOPRIL 5 MG/1
5 TABLET ORAL DAILY
COMMUNITY
End: 2023-07-17

## 2023-07-17 RX ORDER — LISINOPRIL 2.5 MG/1
2.5 TABLET ORAL DAILY
Qty: 90 TABLET | Refills: 3 | Status: SHIPPED | OUTPATIENT
Start: 2023-07-17

## 2023-07-17 NOTE — PROGRESS NOTES
Leti See  1965  Date of Service: 7/17/2023    Patient Active Problem List    Diagnosis Date Noted    Coronary artery disease due to lipid rich plaque 12/23/2022     Priority: Medium     Overview Note:     Inferior STEMI 12/17/2022. 3.5 x 26 mm drug-eluting stent to circumflex 12/17/2022. Ischemic cardiomyopathy 12/19/2022     Priority: Medium     Overview Note:     EF 45%        Stage 3a chronic kidney disease ([address]) 12/19/2022     Priority: Medium    Primary hypertension 12/19/2022     Priority: Medium    STEMI (ST elevation myocardial infarction) ([address]) 12/17/2022     Priority: Medium       Social History     Socioeconomic History    Marital status:      Spouse name: Porsha Mancini    Number of children: 2    Years of education: 13    Highest education level: None   Tobacco Use    Smoking status: Never    Smokeless tobacco: Never   Vaping Use    Vaping Use: Never used   Substance and Sexual Activity    Alcohol use:  Yes     Alcohol/week: 4.0 standard drinks     Types: 4 Cans of beer per week     Comment: occassional    Drug use: Never    Sexual activity: Yes     Partners: Female       Current Outpatient Medications   Medication Sig Dispense Refill    lisinopril (PRINIVIL;ZESTRIL) 5 MG tablet Take 1 tablet by mouth daily      RA ASPIRIN EC 81 MG EC tablet take 1 tablet by mouth once daily 180 tablet 3    carvedilol (COREG) 3.125 MG tablet Take 1 tablet by mouth 2 times daily (with meals) 180 tablet 3    Cholecalciferol (VITAMIN D3) 125 MCG (5000 UT) TABS Take by mouth      Multiple Vitamins-Minerals (THERAPEUTIC MULTIVITAMIN-MINERALS) tablet Take 1 tablet by mouth daily      prasugrel (EFFIENT) 10 MG TABS Take 1 tablet by mouth daily 90 tablet 3    rosuvastatin (CRESTOR) 40 MG tablet Take 1 tablet by mouth nightly 90 tablet 3    lisinopril (PRINIVIL;ZESTRIL) 10 MG tablet Take 1 tablet by mouth daily (Patient not taking: Reported on 7/17/2023) 90 tablet 3     No current facility-administered

## 2023-11-17 RX ORDER — CARVEDILOL 3.12 MG/1
3.12 TABLET ORAL 2 TIMES DAILY WITH MEALS
Qty: 180 TABLET | Refills: 3 | Status: SHIPPED | OUTPATIENT
Start: 2023-11-17

## 2023-11-20 RX ORDER — ROSUVASTATIN CALCIUM 40 MG/1
40 TABLET, COATED ORAL NIGHTLY
Qty: 90 TABLET | Refills: 3 | Status: SHIPPED | OUTPATIENT
Start: 2023-11-20

## 2023-11-28 RX ORDER — CARVEDILOL 6.25 MG/1
6.25 TABLET ORAL 2 TIMES DAILY WITH MEALS
Qty: 180 TABLET | Refills: 3 | OUTPATIENT
Start: 2023-11-28

## 2023-11-29 RX ORDER — PRASUGREL 10 MG/1
10 TABLET, FILM COATED ORAL DAILY
Qty: 90 TABLET | Refills: 3 | Status: SHIPPED | OUTPATIENT
Start: 2023-11-29

## 2024-04-19 ENCOUNTER — TELEPHONE (OUTPATIENT)
Dept: CARDIOLOGY CLINIC | Age: 59
End: 2024-04-19

## 2024-04-19 NOTE — TELEPHONE ENCOUNTER
Patient being scheduled for colonoscopy at [facility] under [facility] by Dr. Vu.  Recommended to hold Aspirin and Effient (2-5) days prior.

## 2024-04-19 NOTE — TELEPHONE ENCOUNTER
Okay to hold the Effient for 5 days before the procedure.  However, he has coronary artery disease and a STEMI 12-22.  I would recommend not holding the aspirin if at all possible for the procedure.

## 2024-05-23 ENCOUNTER — TELEPHONE (OUTPATIENT)
Dept: CARDIOLOGY CLINIC | Age: 59
End: 2024-05-23

## 2024-05-23 NOTE — TELEPHONE ENCOUNTER
----- Message from Miguel Prasad sent at 5/23/2024  9:20 AM EDT -----  Regarding: Upcoming Colonoscopy   Contact: [phone]  Good morning, I am scheduled for a Colonoscopy next week and wanted to verify if Dr. Parisi wanted me to hold off on the  medication or not as listed in the attached file.      Thank you.      Dougie Prasad   [birth year]

## 2024-05-23 NOTE — TELEPHONE ENCOUNTER
It is okay with cardiology to hold the Effient for 7 days prior to procedure if needed.  I recommend that he does not miss any doses of aspirin.

## 2024-07-26 RX ORDER — LISINOPRIL 2.5 MG/1
2.5 TABLET ORAL DAILY
Qty: 90 TABLET | Refills: 3 | Status: SHIPPED | OUTPATIENT
Start: 2024-07-26

## 2024-07-26 RX ORDER — PRASUGREL 10 MG/1
10 TABLET, FILM COATED ORAL DAILY
Qty: 90 TABLET | Refills: 3 | Status: SHIPPED | OUTPATIENT
Start: 2024-07-26

## 2024-09-26 ENCOUNTER — OFFICE VISIT (OUTPATIENT)
Dept: CARDIOLOGY CLINIC | Age: 59
End: 2024-09-26
Payer: COMMERCIAL

## 2024-09-26 VITALS
WEIGHT: 229.8 LBS | BODY MASS INDEX: 32.9 KG/M2 | DIASTOLIC BLOOD PRESSURE: 82 MMHG | SYSTOLIC BLOOD PRESSURE: 118 MMHG | RESPIRATION RATE: 17 BRPM | HEART RATE: 58 BPM | HEIGHT: 70 IN

## 2024-09-26 DIAGNOSIS — I25.5 ISCHEMIC CARDIOMYOPATHY: Primary | ICD-10-CM

## 2024-09-26 PROCEDURE — 3079F DIAST BP 80-89 MM HG: CPT | Performed by: INTERNAL MEDICINE

## 2024-09-26 PROCEDURE — 93000 ELECTROCARDIOGRAM COMPLETE: CPT | Performed by: INTERNAL MEDICINE

## 2024-09-26 PROCEDURE — 3074F SYST BP LT 130 MM HG: CPT | Performed by: INTERNAL MEDICINE

## 2024-09-26 PROCEDURE — 99214 OFFICE O/P EST MOD 30 MIN: CPT | Performed by: INTERNAL MEDICINE

## 2024-11-21 RX ORDER — CARVEDILOL 3.12 MG/1
3.12 TABLET ORAL 2 TIMES DAILY WITH MEALS
Qty: 180 TABLET | Refills: 3 | Status: SHIPPED | OUTPATIENT
Start: 2024-11-21

## 2024-11-21 RX ORDER — CARVEDILOL 3.12 MG/1
3.12 TABLET ORAL 2 TIMES DAILY WITH MEALS
Qty: 180 TABLET | Refills: 3 | OUTPATIENT
Start: 2024-11-21

## 2024-12-03 RX ORDER — ROSUVASTATIN CALCIUM 40 MG/1
40 TABLET, COATED ORAL NIGHTLY
Qty: 90 TABLET | Refills: 3 | Status: SHIPPED | OUTPATIENT
Start: 2024-12-03

## 2025-03-15 ENCOUNTER — HOSPITAL ENCOUNTER (OUTPATIENT)
Dept: ULTRASOUND IMAGING | Age: 60
Discharge: HOME | End: 2025-03-17
Attending: INTERNAL MEDICINE
Payer: COMMERCIAL

## 2025-03-15 DIAGNOSIS — Q61.3 CONGENITAL POLYCYSTIC KIDNEY DISEASE: ICD-10-CM

## 2025-03-15 DIAGNOSIS — N18.2 CHRONIC KIDNEY DISEASE (CKD), STAGE II (MILD): ICD-10-CM

## 2025-03-15 PROCEDURE — 76770 US EXAM ABDO BACK WALL COMP: CPT

## 2025-07-25 RX ORDER — LISINOPRIL 2.5 MG/1
2.5 TABLET ORAL DAILY
Qty: 90 TABLET | Refills: 3 | Status: SHIPPED | OUTPATIENT
Start: 2025-07-25

## 2025-07-25 RX ORDER — PRASUGREL 10 MG/1
10 TABLET, FILM COATED ORAL DAILY
Qty: 90 TABLET | Refills: 3 | Status: SHIPPED | OUTPATIENT
Start: 2025-07-25